# Patient Record
Sex: FEMALE | Race: WHITE | NOT HISPANIC OR LATINO | Employment: OTHER | URBAN - METROPOLITAN AREA
[De-identification: names, ages, dates, MRNs, and addresses within clinical notes are randomized per-mention and may not be internally consistent; named-entity substitution may affect disease eponyms.]

---

## 2020-04-25 ENCOUNTER — HOSPITAL ENCOUNTER (EMERGENCY)
Facility: HOSPITAL | Age: 85
Discharge: HOME | End: 2020-04-26
Attending: EMERGENCY MEDICINE
Payer: MEDICARE

## 2020-04-25 DIAGNOSIS — R63.0 DECREASED APPETITE: Primary | ICD-10-CM

## 2020-04-25 DIAGNOSIS — U07.1 COVID-19: ICD-10-CM

## 2020-04-25 DIAGNOSIS — E86.1 INTRAVASCULAR VOLUME DEPLETION: ICD-10-CM

## 2020-04-25 PROCEDURE — 96374 THER/PROPH/DIAG INJ IV PUSH: CPT

## 2020-04-25 PROCEDURE — 99284 EMERGENCY DEPT VISIT MOD MDM: CPT | Mod: 25

## 2020-04-25 PROCEDURE — 96361 HYDRATE IV INFUSION ADD-ON: CPT

## 2020-04-26 VITALS
DIASTOLIC BLOOD PRESSURE: 78 MMHG | BODY MASS INDEX: 24.75 KG/M2 | HEIGHT: 66 IN | RESPIRATION RATE: 16 BRPM | SYSTOLIC BLOOD PRESSURE: 149 MMHG | HEART RATE: 88 BPM | WEIGHT: 154 LBS | TEMPERATURE: 98 F | OXYGEN SATURATION: 96 %

## 2020-04-26 LAB
ALBUMIN SERPL BCP-MCNC: 2.8 G/DL (ref 3.5–5.2)
ALP SERPL-CCNC: 104 U/L (ref 55–135)
ALT SERPL W/O P-5'-P-CCNC: 19 U/L (ref 10–44)
ANION GAP SERPL CALC-SCNC: 12 MMOL/L (ref 8–16)
AST SERPL-CCNC: 36 U/L (ref 10–40)
BASOPHILS # BLD AUTO: 0.05 K/UL (ref 0–0.2)
BASOPHILS NFR BLD: 0.4 % (ref 0–1.9)
BILIRUB SERPL-MCNC: 0.7 MG/DL (ref 0.1–1)
BILIRUB UR QL STRIP: ABNORMAL
BUN SERPL-MCNC: 36 MG/DL (ref 10–30)
CALCIUM SERPL-MCNC: 8.8 MG/DL (ref 8.7–10.5)
CHLORIDE SERPL-SCNC: 112 MMOL/L (ref 95–110)
CLARITY UR: CLEAR
CO2 SERPL-SCNC: 20 MMOL/L (ref 23–29)
COLOR UR: YELLOW
CREAT SERPL-MCNC: 1.9 MG/DL (ref 0.5–1.4)
DIFFERENTIAL METHOD: ABNORMAL
EOSINOPHIL # BLD AUTO: 0.2 K/UL (ref 0–0.5)
EOSINOPHIL NFR BLD: 1.4 % (ref 0–8)
ERYTHROCYTE [DISTWIDTH] IN BLOOD BY AUTOMATED COUNT: 13.5 % (ref 11.5–14.5)
EST. GFR  (AFRICAN AMERICAN): 25 ML/MIN/1.73 M^2
EST. GFR  (NON AFRICAN AMERICAN): 22 ML/MIN/1.73 M^2
GLUCOSE SERPL-MCNC: 100 MG/DL (ref 70–110)
GLUCOSE UR QL STRIP: NEGATIVE
HCT VFR BLD AUTO: 41.2 % (ref 37–48.5)
HGB BLD-MCNC: 12.9 G/DL (ref 12–16)
HGB UR QL STRIP: NEGATIVE
IMM GRANULOCYTES # BLD AUTO: 0.06 K/UL (ref 0–0.04)
IMM GRANULOCYTES NFR BLD AUTO: 0.5 % (ref 0–0.5)
KETONES UR QL STRIP: ABNORMAL
LACTATE SERPL-SCNC: 1.6 MMOL/L (ref 0.5–2.2)
LEUKOCYTE ESTERASE UR QL STRIP: NEGATIVE
LYMPHOCYTES # BLD AUTO: 3.3 K/UL (ref 1–4.8)
LYMPHOCYTES NFR BLD: 25.3 % (ref 18–48)
MCH RBC QN AUTO: 29.6 PG (ref 27–31)
MCHC RBC AUTO-ENTMCNC: 31.3 G/DL (ref 32–36)
MCV RBC AUTO: 95 FL (ref 82–98)
MONOCYTES # BLD AUTO: 1.1 K/UL (ref 0.3–1)
MONOCYTES NFR BLD: 8.7 % (ref 4–15)
NEUTROPHILS # BLD AUTO: 8.3 K/UL (ref 1.8–7.7)
NEUTROPHILS NFR BLD: 63.7 % (ref 38–73)
NITRITE UR QL STRIP: NEGATIVE
NRBC BLD-RTO: 0 /100 WBC
PH UR STRIP: 6 [PH] (ref 5–8)
PLATELET # BLD AUTO: 194 K/UL (ref 150–350)
PMV BLD AUTO: 11.2 FL (ref 9.2–12.9)
POTASSIUM SERPL-SCNC: 5.3 MMOL/L (ref 3.5–5.1)
PROT SERPL-MCNC: 7.4 G/DL (ref 6–8.4)
PROT UR QL STRIP: NEGATIVE
RBC # BLD AUTO: 4.36 M/UL (ref 4–5.4)
SODIUM SERPL-SCNC: 144 MMOL/L (ref 136–145)
SP GR UR STRIP: 1.02 (ref 1–1.03)
TROPONIN I SERPL DL<=0.01 NG/ML-MCNC: 0.04 NG/ML (ref 0–0.03)
URN SPEC COLLECT METH UR: ABNORMAL
UROBILINOGEN UR STRIP-ACNC: NEGATIVE EU/DL
WBC # BLD AUTO: 12.95 K/UL (ref 3.9–12.7)

## 2020-04-26 PROCEDURE — 85025 COMPLETE CBC W/AUTO DIFF WBC: CPT

## 2020-04-26 PROCEDURE — 84484 ASSAY OF TROPONIN QUANT: CPT

## 2020-04-26 PROCEDURE — 83605 ASSAY OF LACTIC ACID: CPT

## 2020-04-26 PROCEDURE — 81003 URINALYSIS AUTO W/O SCOPE: CPT

## 2020-04-26 PROCEDURE — 80053 COMPREHEN METABOLIC PANEL: CPT

## 2020-04-26 PROCEDURE — 87040 BLOOD CULTURE FOR BACTERIA: CPT

## 2020-04-26 PROCEDURE — 63600175 PHARM REV CODE 636 W HCPCS: Performed by: EMERGENCY MEDICINE

## 2020-04-26 RX ORDER — ONDANSETRON 4 MG/1
4 TABLET, ORALLY DISINTEGRATING ORAL EVERY 8 HOURS PRN
Qty: 18 TABLET | Refills: 0 | Status: SHIPPED | OUTPATIENT
Start: 2020-04-26 | End: 2022-09-15

## 2020-04-26 RX ORDER — ONDANSETRON 2 MG/ML
8 INJECTION INTRAMUSCULAR; INTRAVENOUS
Status: COMPLETED | OUTPATIENT
Start: 2020-04-26 | End: 2020-04-26

## 2020-04-26 RX ADMIN — ONDANSETRON 8 MG: 2 INJECTION INTRAMUSCULAR; INTRAVENOUS at 01:04

## 2020-04-26 RX ADMIN — SODIUM CHLORIDE, SODIUM LACTATE, POTASSIUM CHLORIDE, AND CALCIUM CHLORIDE 1000 ML: .6; .31; .03; .02 INJECTION, SOLUTION INTRAVENOUS at 12:04

## 2020-04-26 NOTE — ED NOTES
[facility] at [facility]. ETA 1 hour. Charge nurse notified.

## 2020-04-26 NOTE — ED NOTES
Spoke with [facility]; who have accepted patient back to their facility. MD and charge nurse notified.

## 2020-04-26 NOTE — ED PROVIDER NOTES
"SCRIBE #1 NOTE: I, Tamiko Bosch, am scribing for, and in the presence of, Janes Mahan MD. I have scribed the entire note.       History     Chief Complaint   Patient presents with    decresed appetite     covid + , hasnt eaten in 3 days per NH staff     Review of patient's allergies indicates:  No Known Allergies      History of Present Illness     HPI    4/26/2020, 12:18 AM  History limited to pt's dementia.       History of Present Illness: Nargis Pastrana is a 89 y.o. female patient with a h/o depression, glaucoma, HLD, glaucoma,  who presents to the Emergency Department for evaluation of decrease in appetite which onset 3 days ago. Pt is a known COVID-19 positive sent from [facility]. Symptoms are constant and moderate in severity. No mitigating or exacerbating factors reported. HPI/ROS limited due to pt's dementia. Pt has no complaint except "I want to go home," which she states repeatedly.      Arrival mode: EMS/[facility]    PCP: Paul Modi MD      Past Medical History:  Past Medical History:   Diagnosis Date    Depression     Glaucoma (increased eye pressure)     Hyperlipemia     Renal disorder        Past Surgical History:  Past Surgical History:   Procedure Laterality Date    APPENDECTOMY      EYE SURGERY      HYSTERECTOMY           Family History:  Family History   Family history unknown: Yes       Social History:   Social History     Tobacco Use    Smoking status: Never Smoker    Smokeless tobacco: Never Used   Substance and Sexual Activity    Alcohol use: No    Drug use: No    Sexual activity: Unknown        Review of Systems     Review of Systems   Unable to perform ROS: Dementia   Constitutional: Positive for appetite change (decrease).        Physical Exam     Initial Vitals   BP Pulse Resp Temp SpO2   04/25/20 2345 04/25/20 2345 04/25/20 2345 04/26/20 0002 04/25/20 2345   (!) 153/70 91 20 98.6 °F (37 °C) 96 %      MAP       --                 Physical " "Exam  Nursing Notes and Vital Signs Reviewed  Constitutional: Well-developed and well-nourished.   Head: Atraumatic. Normocephalic.  Eyes: EOM intact. No scleral icterus.  ENT: Mucous membranes are moist. Oropharynx is clear and symmetric.    Neck: Supple. Full ROM. No lymphadenopathy.  Cardiovascular: Regular rate. Regular rhythm. No murmurs, rubs, or gallops. Distal pulses are 2+ and symmetric.  Pulmonary/Chest: No respiratory distress. Clear to auscultation bilaterally. No wheezing or rales.  Abdominal: Soft and non-distended.  There is no tenderness.  No rebound, guarding, or rigidity. Good bowel sounds.  Genitourinary: No CVA tenderness  Musculoskeletal: Moves all extremities. No obvious deformities. No calf tenderness.  Skin: Warm and dry.  Neurological: Oriented x0 awake, irritable, nontoxic appearance, in no distress. Normal speech. No acute focal neurological deficits are appreciated.  Psychiatric: Normal affect. Good eye contact. Appropriate in content.     ED Course   Procedures  ED Vital Signs:  Vitals:    04/25/20 2345 04/26/20 0002 04/26/20 0058 04/26/20 0100   BP: (!) 153/70 129/86  136/82   Pulse: 91 96 92    Resp: 20 18 15    Temp:  98.6 °F (37 °C)     TempSrc: Oral Rectal     SpO2: 96% 96% 97%    Weight: 69.9 kg (154 lb)      Height: 5' 6" (1.676 m)       04/26/20 0126 04/26/20 0210 04/26/20 0230 04/26/20 0232   BP:    (!) 162/72   Pulse: 94 89     Resp: 13 13     Temp:   98.1 °F (36.7 °C)    TempSrc:   Rectal    SpO2: 97% 95%     Weight:       Height:        04/26/20 0236 04/26/20 0301 04/26/20 0316 04/26/20 0327   BP:  (!) 154/70  (!) 149/78   Pulse: 90 96 91 88   Resp: 14 18 14 16   Temp:    98.2 °F (36.8 °C)   TempSrc:       SpO2: 95%  96% 96%   Weight:       Height:           Abnormal Lab Results:  Labs Reviewed   CBC W/ AUTO DIFFERENTIAL - Abnormal; Notable for the following components:       Result Value    WBC 12.95 (*)     Mean Corpuscular Hemoglobin Conc 31.3 (*)     Gran # (ANC) 8.3 (*)  "    Immature Grans (Abs) 0.06 (*)     Mono # 1.1 (*)     All other components within normal limits   URINALYSIS, REFLEX TO URINE CULTURE - Abnormal; Notable for the following components:    Ketones, UA 1+ (*)     Bilirubin (UA) 1+ (*)     All other components within normal limits    Narrative:     Preferred Collection Type->Urine, Catheterized   COMPREHENSIVE METABOLIC PANEL - Abnormal; Notable for the following components:    Potassium 5.3 (*)     Chloride 112 (*)     CO2 20 (*)     BUN, Bld 36 (*)     Creatinine 1.9 (*)     Albumin 2.8 (*)     eGFR if  25 (*)     eGFR if non  22 (*)     All other components within normal limits   TROPONIN I - Abnormal; Notable for the following components:    Troponin I 0.043 (*)     All other components within normal limits   CULTURE, BLOOD   CULTURE, BLOOD   LACTIC ACID, PLASMA        All Lab Results:  Results for orders placed or performed during the hospital encounter of 04/25/20   CBC auto differential   Result Value Ref Range    WBC 12.95 (H) 3.90 - 12.70 K/uL    RBC 4.36 4.00 - 5.40 M/uL    Hemoglobin 12.9 12.0 - 16.0 g/dL    Hematocrit 41.2 37.0 - 48.5 %    Mean Corpuscular Volume 95 82 - 98 fL    Mean Corpuscular Hemoglobin 29.6 27.0 - 31.0 pg    Mean Corpuscular Hemoglobin Conc 31.3 (L) 32.0 - 36.0 g/dL    RDW 13.5 11.5 - 14.5 %    Platelets 194 150 - 350 K/uL    MPV 11.2 9.2 - 12.9 fL    Immature Granulocytes 0.5 0.0 - 0.5 %    Gran # (ANC) 8.3 (H) 1.8 - 7.7 K/uL    Immature Grans (Abs) 0.06 (H) 0.00 - 0.04 K/uL    Lymph # 3.3 1.0 - 4.8 K/uL    Mono # 1.1 (H) 0.3 - 1.0 K/uL    Eos # 0.2 0.0 - 0.5 K/uL    Baso # 0.05 0.00 - 0.20 K/uL    nRBC 0 0 /100 WBC    Gran% 63.7 38.0 - 73.0 %    Lymph% 25.3 18.0 - 48.0 %    Mono% 8.7 4.0 - 15.0 %    Eosinophil% 1.4 0.0 - 8.0 %    Basophil% 0.4 0.0 - 1.9 %    Differential Method Automated    Urinalysis, Reflex to Urine Culture Urine, Catheterized   Result Value Ref Range    Specimen UA Urine,  Catheterized     Color, UA Yellow Yellow, Straw, Emily    Appearance, UA Clear Clear    pH, UA 6.0 5.0 - 8.0    Specific Gravity, UA 1.020 1.005 - 1.030    Protein, UA Negative Negative    Glucose, UA Negative Negative    Ketones, UA 1+ (A) Negative    Bilirubin (UA) 1+ (A) Negative    Occult Blood UA Negative Negative    Nitrite, UA Negative Negative    Urobilinogen, UA Negative <2.0 EU/dL    Leukocytes, UA Negative Negative   Lactic acid, plasma   Result Value Ref Range    Lactate (Lactic Acid) 1.6 0.5 - 2.2 mmol/L   Comprehensive metabolic panel   Result Value Ref Range    Sodium 144 136 - 145 mmol/L    Potassium 5.3 (H) 3.5 - 5.1 mmol/L    Chloride 112 (H) 95 - 110 mmol/L    CO2 20 (L) 23 - 29 mmol/L    Glucose 100 70 - 110 mg/dL    BUN, Bld 36 (H) 10 - 30 mg/dL    Creatinine 1.9 (H) 0.5 - 1.4 mg/dL    Calcium 8.8 8.7 - 10.5 mg/dL    Total Protein 7.4 6.0 - 8.4 g/dL    Albumin 2.8 (L) 3.5 - 5.2 g/dL    Total Bilirubin 0.7 0.1 - 1.0 mg/dL    Alkaline Phosphatase 104 55 - 135 U/L    AST 36 10 - 40 U/L    ALT 19 10 - 44 U/L    Anion Gap 12 8 - 16 mmol/L    eGFR if African American 25 (A) >60 mL/min/1.73 m^2    eGFR if non African American 22 (A) >60 mL/min/1.73 m^2   Troponin I   Result Value Ref Range    Troponin I 0.043 (H) 0.000 - 0.026 ng/mL         The EKG was ordered, reviewed, and independently interpreted by the ED provider.  Interpretation time: 23:56  Rate: 97 BPM  Rhythm: NSR  Interpretation: No acute ST changes. No STEMI.      The Emergency Provider reviewed the vital signs and test results, which are outlined above.     ED Discussion     1:36 AM: Reassessed pt at this time.  Pt states her condition has improved at this time. Discussed with pt all pertinent ED information and results. Discussed pt dx and plan of tx. Gave pt all f/u and return to the ED instructions. All questions and concerns were addressed at this time. Pt expresses understanding of information and instructions, and is comfortable with  plan to discharge. Pt is stable for discharge.    I discussed with patient and/or family/caretaker that evaluation in the ED does not suggest any emergent or life threatening medical conditions requiring immediate intervention beyond what was provided in the ED, and I believe patient is safe for discharge.  Regardless, an unremarkable evaluation in the ED does not preclude the development or presence of a serious of life threatening condition. As such, patient was instructed to return immediately for any worsening or change in current symptoms.       MDM        Medical Decision Making:   Clinical Tests:   Lab Tests: Ordered and Reviewed  Medical Tests: Ordered and Reviewed           ED Medication(s):  Medications   lactated ringers bolus 1,000 mL (0 mLs Intravenous Stopped 4/26/20 0147)   ondansetron injection 8 mg (8 mg Intravenous Given 4/26/20 0147)       Discharge Medication List as of 4/26/2020  1:37 AM          Follow-up Information     Paul Modi MD In 1 day.    Specialty:  Internal Medicine  Contact information:  [address] [facility] [address] [phone]             [facility]    Specialty:  Emergency Medicine  Why:  As needed, If symptoms worsen  Contact information:  [address] [facility] [address] [phone]                     Scribe Attestation:   Scribe #1: I performed the above scribed service and the documentation accurately describes the services I performed. I attest to the accuracy of the note.     Attending:   Physician Attestation Statement for Scribe #1: I, Janes Mahan MD, personally performed the services described in this documentation, as scribed by Tamiko Bosch, in my presence, and it is both accurate and complete.           Clinical Impression       ICD-10-CM ICD-9-CM   1. Decreased appetite R63.0 783.0   2. COVID-19 U07.1     J98.8    3. Intravascular volume depletion E86.1 276.52       Disposition:    Disposition: Discharged  Condition: Stable         Janes Mahan MD  04/26/20 5979

## 2020-04-27 NOTE — ED NOTES
TRENICE FROM [place] CALLED AND INFORMED THAT PT HAD + BLOOD CULTURE, GRAM + COCCI RESEMBLING STAPH

## 2020-04-29 LAB
BACTERIA BLD CULT: ABNORMAL

## 2020-04-30 LAB — BACTERIA BLD CULT: NORMAL

## 2022-09-15 ENCOUNTER — HOSPITAL ENCOUNTER (INPATIENT)
Facility: HOSPITAL | Age: 87
LOS: 3 days | Discharge: NURSING, REHAB OR LONG-TERM CARE | DRG: 177 | End: 2022-09-19
Attending: EMERGENCY MEDICINE | Admitting: STUDENT IN AN ORGANIZED HEALTH CARE EDUCATION/TRAINING PROGRAM
Payer: MEDICARE

## 2022-09-15 DIAGNOSIS — J96.01 ACUTE RESPIRATORY FAILURE WITH HYPOXIA: ICD-10-CM

## 2022-09-15 DIAGNOSIS — R93.0 ABNORMAL HEAD CT: ICD-10-CM

## 2022-09-15 DIAGNOSIS — J93.9 PNEUMOTHORAX, UNSPECIFIED TYPE: ICD-10-CM

## 2022-09-15 DIAGNOSIS — R07.9 CHEST PAIN: ICD-10-CM

## 2022-09-15 DIAGNOSIS — R41.82 AMS (ALTERED MENTAL STATUS): ICD-10-CM

## 2022-09-15 DIAGNOSIS — J18.9 PNEUMONIA DUE TO INFECTIOUS ORGANISM, UNSPECIFIED LATERALITY, UNSPECIFIED PART OF LUNG: Primary | ICD-10-CM

## 2022-09-15 PROBLEM — G93.41 ACUTE METABOLIC ENCEPHALOPATHY: Status: ACTIVE | Noted: 2022-09-15

## 2022-09-15 PROBLEM — N17.9 AKI (ACUTE KIDNEY INJURY): Status: ACTIVE | Noted: 2022-09-15

## 2022-09-15 PROBLEM — K59.00 CONSTIPATION: Status: ACTIVE | Noted: 2022-09-15

## 2022-09-15 PROBLEM — J93.11 PRIMARY SPONTANEOUS PNEUMOTHORAX: Status: ACTIVE | Noted: 2022-09-15

## 2022-09-15 LAB
ALBUMIN SERPL BCP-MCNC: 3 G/DL (ref 3.5–5.2)
ALP SERPL-CCNC: 106 U/L (ref 55–135)
ALT SERPL W/O P-5'-P-CCNC: 17 U/L (ref 10–44)
ANION GAP SERPL CALC-SCNC: 10 MMOL/L (ref 8–16)
AST SERPL-CCNC: 21 U/L (ref 10–40)
BASOPHILS # BLD AUTO: 0.04 K/UL (ref 0–0.2)
BASOPHILS NFR BLD: 0.3 % (ref 0–1.9)
BILIRUB SERPL-MCNC: 0.4 MG/DL (ref 0.1–1)
BILIRUB UR QL STRIP: NEGATIVE
BNP SERPL-MCNC: 108 PG/ML (ref 0–99)
BUN SERPL-MCNC: 34 MG/DL (ref 10–30)
CALCIUM SERPL-MCNC: 9.1 MG/DL (ref 8.7–10.5)
CHLORIDE SERPL-SCNC: 112 MMOL/L (ref 95–110)
CLARITY UR: CLEAR
CO2 SERPL-SCNC: 18 MMOL/L (ref 23–29)
COLOR UR: YELLOW
CREAT SERPL-MCNC: 1.5 MG/DL (ref 0.5–1.4)
DIFFERENTIAL METHOD: ABNORMAL
EOSINOPHIL # BLD AUTO: 0.5 K/UL (ref 0–0.5)
EOSINOPHIL NFR BLD: 3.5 % (ref 0–8)
ERYTHROCYTE [DISTWIDTH] IN BLOOD BY AUTOMATED COUNT: 14 % (ref 11.5–14.5)
EST. GFR  (NO RACE VARIABLE): 31 ML/MIN/1.73 M^2
GLUCOSE SERPL-MCNC: 119 MG/DL (ref 70–110)
GLUCOSE UR QL STRIP: NEGATIVE
HCT VFR BLD AUTO: 34.6 % (ref 37–48.5)
HGB BLD-MCNC: 11 G/DL (ref 12–16)
HGB UR QL STRIP: NEGATIVE
HYALINE CASTS #/AREA URNS LPF: 1 /LPF
IMM GRANULOCYTES # BLD AUTO: 0.05 K/UL (ref 0–0.04)
IMM GRANULOCYTES NFR BLD AUTO: 0.4 % (ref 0–0.5)
KETONES UR QL STRIP: NEGATIVE
LACTATE SERPL-SCNC: 1.7 MMOL/L (ref 0.5–2.2)
LEUKOCYTE ESTERASE UR QL STRIP: ABNORMAL
LIPASE SERPL-CCNC: 43 U/L (ref 4–60)
LYMPHOCYTES # BLD AUTO: 4 K/UL (ref 1–4.8)
LYMPHOCYTES NFR BLD: 30.9 % (ref 18–48)
MCH RBC QN AUTO: 29.5 PG (ref 27–31)
MCHC RBC AUTO-ENTMCNC: 31.8 G/DL (ref 32–36)
MCV RBC AUTO: 93 FL (ref 82–98)
MICROSCOPIC COMMENT: ABNORMAL
MONOCYTES # BLD AUTO: 0.7 K/UL (ref 0.3–1)
MONOCYTES NFR BLD: 5.7 % (ref 4–15)
NEUTROPHILS # BLD AUTO: 7.7 K/UL (ref 1.8–7.7)
NEUTROPHILS NFR BLD: 59.2 % (ref 38–73)
NITRITE UR QL STRIP: NEGATIVE
NRBC BLD-RTO: 0 /100 WBC
PH UR STRIP: 5 [PH] (ref 5–8)
PLATELET # BLD AUTO: 208 K/UL (ref 150–450)
PMV BLD AUTO: 9.9 FL (ref 9.2–12.9)
POTASSIUM SERPL-SCNC: 4.2 MMOL/L (ref 3.5–5.1)
PROT SERPL-MCNC: 7.4 G/DL (ref 6–8.4)
PROT UR QL STRIP: NEGATIVE
RBC # BLD AUTO: 3.73 M/UL (ref 4–5.4)
RBC #/AREA URNS HPF: 2 /HPF (ref 0–4)
SARS-COV-2 RDRP RESP QL NAA+PROBE: NEGATIVE
SODIUM SERPL-SCNC: 140 MMOL/L (ref 136–145)
SP GR UR STRIP: 1.02 (ref 1–1.03)
TROPONIN I SERPL DL<=0.01 NG/ML-MCNC: 0.01 NG/ML (ref 0–0.03)
URN SPEC COLLECT METH UR: ABNORMAL
UROBILINOGEN UR STRIP-ACNC: NEGATIVE EU/DL
WBC # BLD AUTO: 12.97 K/UL (ref 3.9–12.7)
WBC #/AREA URNS HPF: 34 /HPF (ref 0–5)
WBC CLUMPS URNS QL MICRO: ABNORMAL

## 2022-09-15 PROCEDURE — 99285 EMERGENCY DEPT VISIT HI MDM: CPT | Mod: 25

## 2022-09-15 PROCEDURE — 81000 URINALYSIS NONAUTO W/SCOPE: CPT | Performed by: EMERGENCY MEDICINE

## 2022-09-15 PROCEDURE — 63600175 PHARM REV CODE 636 W HCPCS: Performed by: EMERGENCY MEDICINE

## 2022-09-15 PROCEDURE — 83690 ASSAY OF LIPASE: CPT | Performed by: EMERGENCY MEDICINE

## 2022-09-15 PROCEDURE — G0378 HOSPITAL OBSERVATION PER HR: HCPCS

## 2022-09-15 PROCEDURE — 25000003 PHARM REV CODE 250: Performed by: EMERGENCY MEDICINE

## 2022-09-15 PROCEDURE — 87040 BLOOD CULTURE FOR BACTERIA: CPT | Mod: 59 | Performed by: EMERGENCY MEDICINE

## 2022-09-15 PROCEDURE — U0002 COVID-19 LAB TEST NON-CDC: HCPCS | Performed by: EMERGENCY MEDICINE

## 2022-09-15 PROCEDURE — 80053 COMPREHEN METABOLIC PANEL: CPT | Performed by: EMERGENCY MEDICINE

## 2022-09-15 PROCEDURE — 84484 ASSAY OF TROPONIN QUANT: CPT | Performed by: EMERGENCY MEDICINE

## 2022-09-15 PROCEDURE — 83605 ASSAY OF LACTIC ACID: CPT | Performed by: EMERGENCY MEDICINE

## 2022-09-15 PROCEDURE — 96365 THER/PROPH/DIAG IV INF INIT: CPT

## 2022-09-15 PROCEDURE — 93005 ELECTROCARDIOGRAM TRACING: CPT

## 2022-09-15 PROCEDURE — 93010 ELECTROCARDIOGRAM REPORT: CPT | Mod: ,,, | Performed by: INTERNAL MEDICINE

## 2022-09-15 PROCEDURE — 63600175 PHARM REV CODE 636 W HCPCS: Performed by: STUDENT IN AN ORGANIZED HEALTH CARE EDUCATION/TRAINING PROGRAM

## 2022-09-15 PROCEDURE — 83880 ASSAY OF NATRIURETIC PEPTIDE: CPT | Performed by: EMERGENCY MEDICINE

## 2022-09-15 PROCEDURE — 85025 COMPLETE CBC W/AUTO DIFF WBC: CPT | Performed by: EMERGENCY MEDICINE

## 2022-09-15 PROCEDURE — 93010 EKG 12-LEAD: ICD-10-PCS | Mod: ,,, | Performed by: INTERNAL MEDICINE

## 2022-09-15 PROCEDURE — 96367 TX/PROPH/DG ADDL SEQ IV INF: CPT

## 2022-09-15 PROCEDURE — 87086 URINE CULTURE/COLONY COUNT: CPT | Performed by: EMERGENCY MEDICINE

## 2022-09-15 PROCEDURE — 96375 TX/PRO/DX INJ NEW DRUG ADDON: CPT

## 2022-09-15 RX ORDER — TALC
6 POWDER (GRAM) TOPICAL NIGHTLY PRN
Status: DISCONTINUED | OUTPATIENT
Start: 2022-09-15 | End: 2022-09-19 | Stop reason: HOSPADM

## 2022-09-15 RX ORDER — ACETAMINOPHEN 325 MG/1
650 TABLET ORAL EVERY 4 HOURS PRN
Status: DISCONTINUED | OUTPATIENT
Start: 2022-09-15 | End: 2022-09-19 | Stop reason: HOSPADM

## 2022-09-15 RX ORDER — SODIUM CHLORIDE, SODIUM LACTATE, POTASSIUM CHLORIDE, CALCIUM CHLORIDE 600; 310; 30; 20 MG/100ML; MG/100ML; MG/100ML; MG/100ML
INJECTION, SOLUTION INTRAVENOUS CONTINUOUS
Status: ACTIVE | OUTPATIENT
Start: 2022-09-15 | End: 2022-09-16

## 2022-09-15 RX ORDER — MIRTAZAPINE 7.5 MG/1
7.5 TABLET, FILM COATED ORAL NIGHTLY
COMMUNITY
Start: 2022-09-08

## 2022-09-15 RX ORDER — ACETAMINOPHEN 325 MG/1
650 TABLET ORAL EVERY 8 HOURS PRN
Status: DISCONTINUED | OUTPATIENT
Start: 2022-09-15 | End: 2022-09-19 | Stop reason: HOSPADM

## 2022-09-15 RX ORDER — NALOXONE HCL 0.4 MG/ML
0.02 VIAL (ML) INJECTION
Status: DISCONTINUED | OUTPATIENT
Start: 2022-09-15 | End: 2022-09-19 | Stop reason: HOSPADM

## 2022-09-15 RX ORDER — IBUPROFEN 200 MG
16 TABLET ORAL
Status: DISCONTINUED | OUTPATIENT
Start: 2022-09-15 | End: 2022-09-19 | Stop reason: HOSPADM

## 2022-09-15 RX ORDER — LATANOPROST 50 UG/ML
1 SOLUTION/ DROPS OPHTHALMIC NIGHTLY
Status: DISCONTINUED | OUTPATIENT
Start: 2022-09-15 | End: 2022-09-19 | Stop reason: HOSPADM

## 2022-09-15 RX ORDER — ONDANSETRON 2 MG/ML
4 INJECTION INTRAMUSCULAR; INTRAVENOUS
Status: COMPLETED | OUTPATIENT
Start: 2022-09-15 | End: 2022-09-15

## 2022-09-15 RX ORDER — MIRTAZAPINE 7.5 MG/1
7.5 TABLET, FILM COATED ORAL NIGHTLY
Status: DISCONTINUED | OUTPATIENT
Start: 2022-09-15 | End: 2022-09-19 | Stop reason: HOSPADM

## 2022-09-15 RX ORDER — DOCUSATE CALCIUM 240 MG
240 CAPSULE ORAL DAILY
Status: ON HOLD | COMMUNITY
End: 2022-09-19

## 2022-09-15 RX ORDER — MORPHINE SULFATE 4 MG/ML
2 INJECTION, SOLUTION INTRAMUSCULAR; INTRAVENOUS
Status: COMPLETED | OUTPATIENT
Start: 2022-09-15 | End: 2022-09-15

## 2022-09-15 RX ORDER — LATANOPROST 50 UG/ML
1 SOLUTION/ DROPS OPHTHALMIC NIGHTLY
COMMUNITY
Start: 2022-09-01

## 2022-09-15 RX ORDER — ONDANSETRON 2 MG/ML
4 INJECTION INTRAMUSCULAR; INTRAVENOUS EVERY 8 HOURS PRN
Status: DISCONTINUED | OUTPATIENT
Start: 2022-09-15 | End: 2022-09-19 | Stop reason: HOSPADM

## 2022-09-15 RX ORDER — SODIUM CHLORIDE 0.9 % (FLUSH) 0.9 %
10 SYRINGE (ML) INJECTION EVERY 12 HOURS PRN
Status: DISCONTINUED | OUTPATIENT
Start: 2022-09-15 | End: 2022-09-19 | Stop reason: HOSPADM

## 2022-09-15 RX ORDER — IBUPROFEN 200 MG
24 TABLET ORAL
Status: DISCONTINUED | OUTPATIENT
Start: 2022-09-15 | End: 2022-09-19 | Stop reason: HOSPADM

## 2022-09-15 RX ORDER — GLUCAGON 1 MG
1 KIT INJECTION
Status: DISCONTINUED | OUTPATIENT
Start: 2022-09-15 | End: 2022-09-19 | Stop reason: HOSPADM

## 2022-09-15 RX ADMIN — ONDANSETRON 4 MG: 2 INJECTION INTRAMUSCULAR; INTRAVENOUS at 08:09

## 2022-09-15 RX ADMIN — AZITHROMYCIN MONOHYDRATE 500 MG: 500 INJECTION, POWDER, LYOPHILIZED, FOR SOLUTION INTRAVENOUS at 02:09

## 2022-09-15 RX ADMIN — MORPHINE SULFATE 2 MG: 4 INJECTION INTRAVENOUS at 08:09

## 2022-09-15 RX ADMIN — SODIUM CHLORIDE, SODIUM LACTATE, POTASSIUM CHLORIDE, AND CALCIUM CHLORIDE: .6; .31; .03; .02 INJECTION, SOLUTION INTRAVENOUS at 05:09

## 2022-09-15 RX ADMIN — CEFTRIAXONE 1 G: 1 INJECTION, SOLUTION INTRAVENOUS at 12:09

## 2022-09-15 NOTE — H&P
[facility] - [address] Medicine  History & Physical    Patient Name: Nargis Pastrana  MRN: [id number]  Patient Class: OP- Observation  Admission Date: 9/15/2022  Attending Physician: Jaime Cruz MD   Primary Care Provider: Paul Modi MD         Patient information was obtained from patient, past medical records and ER records.     Subjective:     Principal Problem:Right lower lobe pneumonia    Chief Complaint:   Chief Complaint   Patient presents with    Altered Mental Status     Resident at [facility], pt suddenly started  moaning and not verbalizing a complaint, she is moaning and this is not her normal per staff, she is rubbing epigastric area o2 sat on scene 89, arrived on 4L NC        HPI: 89 y.o. female patient with a PMHx of renal disorder and hyperlipidemia who presents to the Emergency Department for evaluation of AMS. Pt is a resident at [facility] who was sent to the ED by staff who state pt began suddenly moaning and rubbing epigastric area but not verbalizing a complaint. Staff at [facility] state this is not the pt's normal behavior. CT chest shows RLL consolidation and lung collapse. There is also small apical PTX, CTS consulted.       Review of Systems   Unable to perform ROS: Mental status change   Objective:     Vital Signs (Most Recent):  Temp: 97.6 °F (36.4 °C) (09/15/22 1602)  Pulse: (!) 53 (09/15/22 1602)  Resp: 18 (09/15/22 1602)  BP: 112/76 (09/15/22 1602)  SpO2: 95 % (09/15/22 1602)   Vital Signs (24h Range):  Temp:  [97.6 °F (36.4 °C)-98.7 °F (37.1 °C)] 97.6 °F (36.4 °C)  Pulse:  [] 53  Resp:  [14-28] 18  SpO2:  [88 %-99 %] 95 %  BP: ()/(51-93) 112/76     Weight: 55.8 kg (123 lb 0.3 oz)  Body mass index is 19.86 kg/m².  No intake or output data in the 24 hours ending 09/15/22 1645   Physical Exam  Constitutional:       General: She is in acute distress.      Appearance: Normal appearance. She is ill-appearing.   HENT:      Head:  Normocephalic and atraumatic.      Mouth/Throat:      Mouth: Mucous membranes are dry.   Cardiovascular:      Rate and Rhythm: Regular rhythm. Bradycardia present.   Pulmonary:      Effort: Pulmonary effort is normal.      Breath sounds: Normal breath sounds.   Abdominal:      General: Abdomen is flat. There is no distension.      Palpations: Abdomen is soft.      Tenderness: There is abdominal tenderness. There is no guarding.   Musculoskeletal:         General: No swelling. Normal range of motion.      Right lower leg: No edema.      Left lower leg: No edema.   Skin:     General: Skin is warm and dry.   Neurological:      Mental Status: She is alert. She is disoriented.       Significant Labs: All pertinent labs within the past 24 hours have been reviewed.    Significant Imaging: I have reviewed all pertinent imaging results/findings within the past 24 hours.    Assessment/Plan:     * Right lower lobe pneumonia  S/t aspiration  Empiric Abx  ST to eval and treat      Admit for observation    Acute metabolic encephalopathy  Unclear etiology  S/t above?      Primary spontaneous pneumothorax  Small, apical  CTS consulted, f/u recs  Repeat CXR      Constipation  Enema for BM      TRAV (acute kidney injury)  Patient with acute kidney injury likely due to IVVD/dehydration TRAV is currently stable. Labs reviewed- Renal function/electrolytes with Estimated Creatinine Clearance: 17.1 mL/min (A) (based on SCr of 1.5 mg/dL (H)). according to latest data. Monitor urine output and serial BMP and adjust therapy as needed. Avoid nephrotoxins and renally dose meds for GFR listed above.     S/t above  IV fluids, trend Cr    VTE Risk Mitigation (From admission, onward)         Ordered     IP VTE HIGH RISK PATIENT  Once         09/15/22 1521     Place sequential compression device  Until discontinued         09/15/22 1521                   Jaime Cruz MD  Department of Hospital Medicine   [facility] - Med Surg 3

## 2022-09-15 NOTE — ED PROVIDER NOTES
SCRIBE #1 NOTE: I, Tiara Spaulding, am scribing for, and in the presence of, Jose Daniel Soto MD. I have scribed the entire note.       History     Chief Complaint   Patient presents with    Altered Mental Status     Resident at [facility], pt suddenly started  moaning and not verbalizing a complaint, she is moaning and this is not her normal per staff, she is rubbing epigastric area o2 sat on scene 89, arrived on 4L NC     Review of patient's allergies indicates:  No Known Allergies      History of Present Illness     HPI    9/15/2022, 6:32 AM  History obtained from the patient  HPI and ROS limited due to AMS      History of Present Illness: Nargis Pastrana is a 89 y.o. female patient with a PMHx of renal disorder and hyperlipidemia who presents to the Emergency Department for evaluation of AMS. Pt is a resident at [facility] who was sent to the ED by staff who state pt began suddenly moaning and rubbing epigastric area but not verbalizing a complaint. Staff at [facility] state this is not the pt's normal behavior. Symptoms are constant and moderate in severity. No mitigating or exacerbating factors reported. No prior Tx includes reported. No further complaints or concerns at this time.       Arrival mode: Ambulance service    PCP: Paul Modi MD        Past Medical History:  Past Medical History:   Diagnosis Date    Depression     Glaucoma (increased eye pressure)     Hyperlipemia     Renal disorder        Past Surgical History:  Past Surgical History:   Procedure Laterality Date    APPENDECTOMY      EYE SURGERY      HYSTERECTOMY           Family History:  Family History   Family history unknown: Yes       Social History:  Social History     Tobacco Use    Smoking status: Never    Smokeless tobacco: Never   Substance and Sexual Activity    Alcohol use: No    Drug use: No    Sexual activity: Not on file        Review of Systems     Review of Systems   Unable to perform ROS: Mental status  change      Physical Exam     Initial Vitals [09/15/22 0513]   BP Pulse Resp Temp SpO2   (!) 156/80 102 18 98.7 °F (37.1 °C) (!) 94 %      MAP       --          Physical Exam  Nursing Notes and Vital Signs Reviewed.  Constitutional:  Holding her hands at her epigastric region moaning.   Head: Atraumatic. Normocephalic.  Eyes: PERRL. EOM intact. Conjunctivae are not pale. No scleral icterus.  ENT: Mucous membranes are moist. Oropharynx is clear and symmetric.    Neck: Supple.  Cardiovascular: Regular rate. Regular rhythm. No murmurs, rubs, or gallops. Distal pulses are 2+ and symmetric.  Pulmonary/Chest:   Abdominal: epigastric tenderness.  No rebound, guarding, or rigidity.   Musculoskeletal: Moves all extremities. No obvious deformities. No edema.  Skin: Warm and dry.  Neurological:  Awake and tracking with eye movements.  Moaning.  Unable to answer questions appropriately      ED Course   Critical Care    Date/Time: 9/15/2022 12:58 PM  Performed by: Jose Daniel Soto MD  Authorized by: Jose Daniel Soto MD   Direct patient critical care time: 10 minutes  Additional history critical care time: 10 minutes  Ordering / reviewing critical care time: 5 minutes  Documentation critical care time: 5 minutes  Consulting other physicians critical care time: 5 minutes  Total critical care time (exclusive of procedural time) : 35 minutes  Critical care time was exclusive of separately billable procedures and treating other patients and teaching time.  Critical care was necessary to treat or prevent imminent or life-threatening deterioration of the following conditions: respiratory failure.  Critical care was time spent personally by me on the following activities: blood draw for specimens, development of treatment plan with patient or surrogate, discussions with consultants, evaluation of patient's response to treatment, examination of patient, interpretation of cardiac output measurements, obtaining history from patient or  "surrogate, ordering and performing treatments and interventions, ordering and review of laboratory studies, ordering and review of radiographic studies, pulse oximetry, re-evaluation of patient's condition and review of old charts.      ED Vital Signs:  Vitals:    09/15/22 0513 09/15/22 0525 09/15/22 0730 09/15/22 0800   BP: (!) 156/80  (!) 189/89 (!) 194/92   Pulse: 102 75 93 100   Resp: 18  20 (!) 21   Temp: 98.7 °F (37.1 °C)  98.2 °F (36.8 °C)    TempSrc: Oral  Oral    SpO2: (!) 94%  95% (!) 94%   Weight: 69.9 kg (154 lb)      Height: 5' 6" (1.676 m)       09/15/22 0815 09/15/22 0823 09/15/22 0832 09/15/22 0850   BP:    (!) 144/65   Pulse:  103  93   Resp: (!) 25 (!) 28 16 19   Temp:       TempSrc:       SpO2:  (!) 88%  95%   Weight:       Height:        09/15/22 0902 09/15/22 0930 09/15/22 1030 09/15/22 1130   BP: (!) 121/51 (!) 136/58 (!) 93/55 (!) 128/59   Pulse: 87 82 92 78   Resp: 14 16 (!) 23 17   Temp: 97.8 °F (36.6 °C)      TempSrc: Oral      SpO2: 97% 99% (!) 94% 98%   Weight:       Height:        09/15/22 1200 09/15/22 1300   BP: (!) 141/93 125/65   Pulse: 82 90   Resp: 20 19   Temp:     TempSrc:     SpO2: 98% 96%   Weight:     Height:         Abnormal Lab Results:  Labs Reviewed   CBC W/ AUTO DIFFERENTIAL - Abnormal; Notable for the following components:       Result Value    WBC 12.97 (*)     RBC 3.73 (*)     Hemoglobin 11.0 (*)     Hematocrit 34.6 (*)     MCHC 31.8 (*)     Immature Grans (Abs) 0.05 (*)     All other components within normal limits   B-TYPE NATRIURETIC PEPTIDE - Abnormal; Notable for the following components:     (*)     All other components within normal limits   URINALYSIS, REFLEX TO URINE CULTURE - Abnormal; Notable for the following components:    Leukocytes, UA 2+ (*)     All other components within normal limits    Narrative:     Specimen Source->Urine   COMPREHENSIVE METABOLIC PANEL - Abnormal; Notable for the following components:    Chloride 112 (*)     CO2 18 (*)     " Glucose 119 (*)     BUN 34 (*)     Creatinine 1.5 (*)     Albumin 3.0 (*)     eGFR 31 (*)     All other components within normal limits   URINALYSIS MICROSCOPIC - Abnormal; Notable for the following components:    WBC, UA 34 (*)     WBC Clumps, UA Many (*)     All other components within normal limits    Narrative:     Specimen Source->Urine   CULTURE, URINE   CULTURE, BLOOD   CULTURE, BLOOD   SARS-COV-2 RNA AMPLIFICATION, QUAL   LIPASE   TROPONIN I   LACTIC ACID, PLASMA        All Lab Results:  Results for orders placed or performed during the hospital encounter of 09/15/22   CBC auto differential   Result Value Ref Range    WBC 12.97 (H) 3.90 - 12.70 K/uL    RBC 3.73 (L) 4.00 - 5.40 M/uL    Hemoglobin 11.0 (L) 12.0 - 16.0 g/dL    Hematocrit 34.6 (L) 37.0 - 48.5 %    MCV 93 82 - 98 fL    MCH 29.5 27.0 - 31.0 pg    MCHC 31.8 (L) 32.0 - 36.0 g/dL    RDW 14.0 11.5 - 14.5 %    Platelets 208 150 - 450 K/uL    MPV 9.9 9.2 - 12.9 fL    Immature Granulocytes 0.4 0.0 - 0.5 %    Gran # (ANC) 7.7 1.8 - 7.7 K/uL    Immature Grans (Abs) 0.05 (H) 0.00 - 0.04 K/uL    Lymph # 4.0 1.0 - 4.8 K/uL    Mono # 0.7 0.3 - 1.0 K/uL    Eos # 0.5 0.0 - 0.5 K/uL    Baso # 0.04 0.00 - 0.20 K/uL    nRBC 0 0 /100 WBC    Gran % 59.2 38.0 - 73.0 %    Lymph % 30.9 18.0 - 48.0 %    Mono % 5.7 4.0 - 15.0 %    Eosinophil % 3.5 0.0 - 8.0 %    Basophil % 0.3 0.0 - 1.9 %    Differential Method Automated    Brain natriuretic peptide   Result Value Ref Range     (H) 0 - 99 pg/mL   COVID-19 Rapid Screening   Result Value Ref Range    SARS-CoV-2 RNA, Amplification, Qual Negative Negative   Urinalysis, Reflex to Urine Culture Urine, Catheterized    Specimen: Urine   Result Value Ref Range    Specimen UA Urine, Catheterized     Color, UA Yellow Yellow, Straw, Emily    Appearance, UA Clear Clear    pH, UA 5.0 5.0 - 8.0    Specific Gravity, UA 1.020 1.005 - 1.030    Protein, UA Negative Negative    Glucose, UA Negative Negative    Ketones, UA Negative  Negative    Bilirubin (UA) Negative Negative    Occult Blood UA Negative Negative    Nitrite, UA Negative Negative    Urobilinogen, UA Negative <2.0 EU/dL    Leukocytes, UA 2+ (A) Negative   Comprehensive metabolic panel   Result Value Ref Range    Sodium 140 136 - 145 mmol/L    Potassium 4.2 3.5 - 5.1 mmol/L    Chloride 112 (H) 95 - 110 mmol/L    CO2 18 (L) 23 - 29 mmol/L    Glucose 119 (H) 70 - 110 mg/dL    BUN 34 (H) 10 - 30 mg/dL    Creatinine 1.5 (H) 0.5 - 1.4 mg/dL    Calcium 9.1 8.7 - 10.5 mg/dL    Total Protein 7.4 6.0 - 8.4 g/dL    Albumin 3.0 (L) 3.5 - 5.2 g/dL    Total Bilirubin 0.4 0.1 - 1.0 mg/dL    Alkaline Phosphatase 106 55 - 135 U/L    AST 21 10 - 40 U/L    ALT 17 10 - 44 U/L    Anion Gap 10 8 - 16 mmol/L    eGFR 31 (A) >60 mL/min/1.73 m^2   Lipase   Result Value Ref Range    Lipase 43 4 - 60 U/L   Troponin I   Result Value Ref Range    Troponin I 0.010 0.000 - 0.026 ng/mL   Urinalysis Microscopic   Result Value Ref Range    RBC, UA 2 0 - 4 /hpf    WBC, UA 34 (H) 0 - 5 /hpf    WBC Clumps, UA Many (A) None-Rare    Hyaline Casts, UA 1 0-1/lpf /lpf    Microscopic Comment SEE COMMENT    Lactic acid, plasma   Result Value Ref Range    Lactate (Lactic Acid) 1.7 0.5 - 2.2 mmol/L         Imaging Results:  Imaging Results              MRI Brain Without Contrast (Final result)  Result time 09/15/22 14:04:28      Final result by Oziel Larsen MD (09/15/22 14:04:28)                   Impression:      No acute or focal process.  Moderately advanced atrophy and white matter degeneration.      Electronically signed by: Oziel Larsen MD  Date:    09/15/2022  Time:    14:04               Narrative:    EXAMINATION:  MRI BRAIN WITHOUT CONTRAST    CLINICAL HISTORY:  Aphasia.  TIA.  Mental status change, unknown cause;    TECHNIQUE:  Standard multiplanar noncontrast sequences of the brain.    COMPARISON:  CT brain 09/15/2022.    FINDINGS:  The ventricles are moderately enlarged as are the extra-axial CSF  spaces.    There is moderately extensive white matter hyperintensity, more pronounced in the frontal lobe distribution.    The gradient echo sequence is negative.  No evidence of chronic hemorrhage.    No extra-axial fluid collection is seen.    Skull base appears normal.    The diffusion sequence is negative.                                       CT Chest Abdomen Pelvis Without Contrast (XPD) (Final result)  Result time 09/15/22 10:00:57   Procedure changed from CT Chest Abdomen Pelvis With Contrast (xpd)     Final result by Anette White MD (09/15/22 10:00:57)                   Impression:      Near complete collapse of the right lower lobe with superimposed consolidation, concerning for aspiration or pneumonia.  Opacity present within the right lower lobe bronchial tree, concerning for aspiration, mucous plugging, or endobronchial lesion.    Small right pneumothorax.    Mild deformity of the mid sternal body with slightly increased sclerosis at this site.  Correlate for prior injury.  No definite acute fracture seen.  Underlying osseous lesion cannot be excluded.    Bilateral pulmonary nodules with the largest measuring 1.3 cm on the right, partially calcified.  A noncalcified 1.0 cm nodule present within the lingula.  For a solid nodule >8 mm, Fleischner Society 2017 guidelines recommend considering CT, PET/CT or tissue sampling at 3 months.    Additional findings as above.    Findings discussed with Dr. Soto over the telephone at 10:00 on 09/15/2022.      Electronically signed by: Anette White  Date:    09/15/2022  Time:    10:00               Narrative:    EXAMINATION:  CT CHEST ABDOMEN PELVIS WITHOUT CONTRAST(XPD)    CLINICAL HISTORY:  Sepsis;complaining of chest and abdomen pain. Dementia. can't provide accurate history;    TECHNIQUE:  Low dose axial images, sagittal and coronal reformations were obtained from the thoracic inlet to the pubic synthesis .  Oral contrast was not administered. All  CT scans at this facility are performed using dose optimization techniques including the following: automated exposure control; adjustment of the mA and/or kV; use of iterative reconstruction technique.    COMPARISON:  Chest radiograph 09/15/2022    FINDINGS:  Beam hardening artifact, patient motion, noncontrast technique somewhat limits evaluation.    Thoracic soft tissues: No significant abnormality.    Aorta: Mildly tortuous, however normal in caliber.  There is severe atherosclerotic calcification.  Significant calcification additionally present at the origin of the left subclavian artery.    Heart: Normal in size small pericardial effusion.  There is severe coronary artery atherosclerosis.  There is significant calcification of the aortic valve.  Additional calcification present at the mitral valve annulus.    Jasmyn/Mediastinum: No significant lymphadenopathy    Lungs: There is a small right pneumothorax.  There is near complete collapse of the right lower lobe with superimposed consolidation.  Opacity present within the right lower lobe bronchial tree.  There is mild ground-glass density within the right middle lobe, which may be due to atelectatic change versus additional infiltrate.  Within the posterior right upper lobe lies a partially calcified 1.1 x 1.3 cm nodule.  The left lung demonstrates dependent density within the posterior aspect of the left lower lobe which may be due to atelectatic change or additional site of infiltrate.  There is a noncalcified 1.0 cm nodule within the lingula (axial series 6, image 208).  There is trace bilateral pleural fluid, right more than left.  No left pneumothorax.  Bronchial tree on the left appears patent..    Liver: Appears normal in size and attenuation without concerning focal hepatic abnormality within the limits of noncontrast technique and beam hardening artifact.  Calcified granulomas noted.    Gallbladder: No calcified gallstones.    Bile Ducts: No evidence of  dilated ducts.    Pancreas: No mass or peripancreatic fat stranding.    Spleen: Unremarkable.    Adrenals: Unremarkable.    Kidneys/ Ureters: Kidneys are small in size with a lobular contour, normal invade location.  No nephrolithiasis or hydronephrosis.  Ureters are normal in course and caliber.  Normal concentration and excretion of contrast. No hydronephrosis or nephrolithiasis. No ureteral dilatation.    Bladder: No evidence of wall thickening.    Reproductive organs: Unremarkable.    GI Tract/Mesentery: Small hiatal hernia.  No evidence of bowel obstruction or inflammation. Significant volume stool within the rectum.  No inflammatory change in the expected region of the appendix to indicate appendicitis.  There are few colonic diverticula without inflammatory change of diverticulitis.  Portions of the sigmoid colon are decompressed with mild mural prominence, suspected related to decompression, though underlying lesion cannot be excluded at these sites, particularly at the rectosigmoid junction (axial series 3, image 140).  Correlate with recent colonoscopy.    Peritoneal Space: No ascites. No free air.    Retroperitoneum: No significant adenopathy.    Abdominal wall: Small fat containing umbilical and right inguinal hernias.    Vasculature: There is severe aortic atherosclerosis.  No aneurysm.    Bones: No acute fracture. There is osteopenia and degenerative change of the spine noting grade 1 retrolisthesis of L2 on L3.  There is mild deformity of the mid sternal body with slightly increased sclerosis at this site.  Correlate for prior injury.                                       CT Head Without Contrast (Final result)  Result time 09/15/22 08:19:59      Final result by Anette White MD (09/15/22 08:19:59)                   Impression:      Small 7 x 4 mm extra-axial lesion overlying the right frontal lobe.  Finding may be incidental and relate to etiology such as small meningioma, though tiny  hematoma cannot be excluded.  No underlying mass effect.  Recommend MRI for further evaluation.    Generalized cerebral volume loss with moderate degree of chronic microvascular ischemic change.  Element of normal pressure hydrocephalus is possible.      Electronically signed by: Anette White  Date:    09/15/2022  Time:    08:19               Narrative:    EXAMINATION:  CT HEAD WITHOUT CONTRAST    CLINICAL HISTORY:  Mental status change, unknown cause;    TECHNIQUE:  Low dose axial CT images obtained throughout the head without intravenous contrast. Sagittal and coronal reconstructions were performed.  All CT scans at this facility are performed using dose optimization techniques including the following: automated exposure control; adjustment of the mA and/or kV; use of iterative reconstruction technique.    COMPARISON:  CT head without contrast 06/22/2013    FINDINGS:  Intracranial compartment:    There is diffuse ventricular and sulcal enlargement, most suggestive of generalized cerebral volume loss.  Element of normal pressure hydrocephalus not excluded.  Small 7 x 4 mm extra-axial focus over the right frontal lobe.  No underlying mass effect    There is moderate hypoattenuation within the supratentorial white matter, most commonly seen in the setting of chronic microvascular ischemic change.  No parenchymal mass, hemorrhage, edema or major vascular distribution infarct.    Skull/extracranial contents (limited evaluation): No fracture. Mastoid air cells and paranasal sinuses are essentially clear.                                       X-Ray Pelvis Routine AP (Final result)  Result time 09/15/22 07:21:26      Final result by Jose Daniel Pham MD (09/15/22 07:21:26)                   Impression:      No acute fracture or dislocation.      Electronically signed by: Jose Daniel Pham MD  Date:    09/15/2022  Time:    07:21               Narrative:    EXAMINATION:  XR PELVIS ROUTINE AP    CLINICAL HISTORY:  XR PELVIS  ROUTINE APAltered mental status, unspecified    COMPARISON:  None    FINDINGS:  AP views of the pelvis were obtained.    No evidence of acute fracture or dislocation.  Bony mineralization is normal.  Soft tissues are unremarkable.   Mild constipation.  Mild degenerative changes lower lumbar spine and bilateral acetabulum.                                       X-Ray Chest AP Portable (Edited Result - FINAL)  Result time 09/15/22 10:33:52      Addendum (preliminary) 1 of 1 by Jose Daniel Pham MD (09/15/22 10:33:52)      Small right apical pneumothorax.      Electronically signed by: Jose Daniel Pham MD  Date:    09/15/2022  Time:    10:33                 Final result by Jose Daniel Pham MD (09/15/22 07:26:57)                   Impression:      No acute process seen.      Electronically signed by: Jose Daniel Pham MD  Date:    09/15/2022  Time:    07:26               Narrative:    EXAMINATION:  XR CHEST AP PORTABLE    CLINICAL HISTORY:  hypoxia;    FINDINGS:  Single view of the chest.  Comparison 06/22/2013    Cardiac silhouette is normal.  Aorta demonstrates atherosclerotic disease. The lungs demonstrate no evidence of active disease.  Mild basilar atelectasis.  No evidence of pleural effusion or pneumothorax.  Bones appear intact.  Moderate scattered degenerative change.                                       The EKG was ordered, reviewed, and independently interpreted by the ED provider.  Interpretation time: 6:58  Rate: 89 BPM  Rhythm:  Sinus rhythm with Premature atrial complexes  Interpretation: No acute ST or T wave elevations or abnormalities. No STEMI.             The Emergency Provider reviewed the vital signs and test results, which are outlined above.     ED Discussion   11:29 PM: Consulted Dr. Arzola (Cardiothoracic surgery) for recommendations on management of PTX.He advises no chest tube at this time. observe over 24 hours with repeat CXR.    2:14 PM: Discussed case with Evita Cruz NP (Hospital Medicine).  Dr. Cruz agrees with current care and management of pt and accepts admission.   Admitting Service: Hospital medicine   Admitting Physician: Dr. Cruz  Admit to: obs    2:15 PM: Re-evaluated pt. I have discussed test results, shared treatment plan, and the need for admission with patient and family at bedside. Pt and family express understanding at this time and agree with all information. All questions answered. Pt and family have no further questions or concerns at this time. Pt is ready for admit.                ED Course as of 09/16/22 0832   Thu Sep 15, 2022   0811 Patient moaning in pain. Hands on epigastric region. CT chest/abd/pelvis ordered. Pain med ordered [DP]      ED Course User Index  [DP] Jose Daniel Soto MD     Medical Decision Making:   Clinical Tests:   Lab Tests: Ordered and Reviewed  Radiological Study: Ordered and Reviewed  Medical Tests: Ordered and Reviewed         ED Medication(s):  Medications   azithromycin 500 mg in dextrose 5 % 250 mL IVPB (ready to mix system) (has no administration in time range)   morphine injection 2 mg (2 mg Intravenous Given 9/15/22 0815)   ondansetron injection 4 mg (4 mg Intravenous Given 9/15/22 0818)   morphine injection 2 mg (2 mg Intravenous Given 9/15/22 0832)   cefTRIAXone (ROCEPHIN) 1 g/50 mL D5W IVPB (1 g Intravenous New Bag 9/15/22 1242)       New Prescriptions    No medications on file               Scribe Attestation:   Scribe #1: I performed the above scribed service and the documentation accurately describes the services I performed. I attest to the accuracy of the note.     Attending:   Physician Attestation Statement for Scribe #1: I, Jose Daniel Soto MD, personally performed the services described in this documentation, as scribed by Tiara Spaulding, in my presence, and it is both accurate and complete.           Clinical Impression       ICD-10-CM ICD-9-CM   1. Pneumonia due to infectious organism, unspecified laterality, unspecified part of lung  J18.9  486   2. AMS (altered mental status)  R41.82 780.97   3. Pneumothorax, unspecified type  J93.9 512.89   4. Abnormal head CT  R93.0 793.0   5. Acute respiratory failure with hypoxia  J96.01 518.81       Disposition:   Disposition: Admitted  Condition: Adeline Soto MD  09/16/22 0837

## 2022-09-15 NOTE — SUBJECTIVE & OBJECTIVE
Review of Systems   Unable to perform ROS: Mental status change   Objective:     Vital Signs (Most Recent):  Temp: 97.6 °F (36.4 °C) (09/15/22 1602)  Pulse: (!) 53 (09/15/22 1602)  Resp: 18 (09/15/22 1602)  BP: 112/76 (09/15/22 1602)  SpO2: 95 % (09/15/22 1602)   Vital Signs (24h Range):  Temp:  [97.6 °F (36.4 °C)-98.7 °F (37.1 °C)] 97.6 °F (36.4 °C)  Pulse:  [] 53  Resp:  [14-28] 18  SpO2:  [88 %-99 %] 95 %  BP: ()/(51-93) 112/76     Weight: 55.8 kg (123 lb 0.3 oz)  Body mass index is 19.86 kg/m².  No intake or output data in the 24 hours ending 09/15/22 1645   Physical Exam  Constitutional:       General: She is in acute distress.      Appearance: Normal appearance. She is ill-appearing.   HENT:      Head: Normocephalic and atraumatic.      Mouth/Throat:      Mouth: Mucous membranes are dry.   Cardiovascular:      Rate and Rhythm: Regular rhythm. Bradycardia present.   Pulmonary:      Effort: Pulmonary effort is normal.      Breath sounds: Normal breath sounds.   Abdominal:      General: Abdomen is flat. There is no distension.      Palpations: Abdomen is soft.      Tenderness: There is abdominal tenderness. There is no guarding.   Musculoskeletal:         General: No swelling. Normal range of motion.      Right lower leg: No edema.      Left lower leg: No edema.   Skin:     General: Skin is warm and dry.   Neurological:      Mental Status: She is alert. She is disoriented.       Significant Labs: All pertinent labs within the past 24 hours have been reviewed.    Significant Imaging: I have reviewed all pertinent imaging results/findings within the past 24 hours.

## 2022-09-15 NOTE — CONSULTS
[facility] - Emergency Dept.  Cardiothoracic Surgery  Consult Note    Patient Name: Nargis Pastrana  MRN: [id number]  Admission Date: 9/15/2022  Attending Physician: Jose Daniel Soto MD  Referring Provider: Self, Aaareferral    Patient information was obtained from ER records.     Consults  Subjective:     Chief Complaint/Reason for Admission:  Patient admitted for altered mental status on workup CT scan showed a small right apical pneumothorax which was not picked up on the examination the patient is not having any symptoms of dyspnea.    History of Present Illness: Patient is severely hard of hearing and unable to obtained a detailed history.  The patient is having altered mental status and he was seen in the emergency room and getting a complete workup for that.    No current facility-administered medications on file prior to encounter.     Current Outpatient Medications on File Prior to Encounter   Medication Sig    aspirin 81 MG Chew Take 81 mg by mouth once daily.    calcium carbonate (OS-LEE ANN) 600 mg (1,500 mg) Tab Take 600 mg by mouth 2 (two) times daily with meals.    citalopram (CELEXA) 20 MG tablet Take 20 mg by mouth once daily.    desoximetasone (TOPICORT) 0.05 % cream Apply topically 2 (two) times daily.    hydrocodone-acetaminophen (VICODIN) 5-500 mg per tablet Take 1 tablet by mouth every 8 (eight) hours as needed for Pain.    mirtazapine (REMERON) 30 MG tablet Take 30 mg by mouth every evening.    multivitamin capsule Take 1 capsule by mouth once daily.    ondansetron (ZOFRAN-ODT) 4 MG TbDL Take 1 tablet (4 mg total) by mouth every 8 (eight) hours as needed (nausea/vomiting).    pravastatin (PRAVACHOL) 40 MG tablet Take 40 mg by mouth once daily.       Review of patient's allergies indicates:  No Known Allergies    Past Medical History:   Diagnosis Date    Depression     Glaucoma (increased eye pressure)     Hyperlipemia     Renal disorder      Past Surgical History:   Procedure Laterality Date    APPENDECTOMY       EYE SURGERY      HYSTERECTOMY       Family History       Family history is unknown by patient.          Tobacco Use    Smoking status: Never    Smokeless tobacco: Never   Substance and Sexual Activity    Alcohol use: No    Drug use: No    Sexual activity: Not on file     Review of Systems   Unable to perform ROS: Age   Objective:     Vital Signs (Most Recent):  Temp: 97.8 °F (36.6 °C) (09/15/22 0902)  Pulse: 90 (09/15/22 1300)  Resp: 19 (09/15/22 1300)  BP: 125/65 (09/15/22 1300)  SpO2: 96 % (09/15/22 1300) Vital Signs (24h Range):  Temp:  [97.8 °F (36.6 °C)-98.7 °F (37.1 °C)] 97.8 °F (36.6 °C)  Pulse:  [] 90  Resp:  [14-28] 19  SpO2:  [88 %-99 %] 96 %  BP: ()/(51-93) 125/65     Weight: 69.9 kg (154 lb)  Body mass index is 24.86 kg/m².    SpO2: 96 %  O2 Device (Oxygen Therapy): nasal cannula     Intake/Output - Last 3 Shifts       None             Lines/Drains/Airways       Peripheral Intravenous Line  Duration                  Peripheral IV - Single Lumen 09/15/22 0541 20 G Left Forearm <1 day                    Physical Exam  Vitals and nursing note reviewed.   Constitutional:       Appearance: Normal appearance.   HENT:      Head: Normocephalic and atraumatic.      Nose: Congestion present.      Mouth/Throat:      Mouth: Mucous membranes are moist.   Eyes:      Extraocular Movements: Extraocular movements intact.      Pupils: Pupils are equal, round, and reactive to light.   Cardiovascular:      Rate and Rhythm: Normal rate and regular rhythm.   Pulmonary:      Effort: Pulmonary effort is normal.      Breath sounds: Normal breath sounds.   Abdominal:      Palpations: Abdomen is soft.   Musculoskeletal:      Cervical back: Normal range of motion and neck supple.   Skin:     General: Skin is warm.      Capillary Refill: Capillary refill takes less than 2 seconds.   Neurological:      General: No focal deficit present.      Mental Status: Mental status is at baseline.       Significant Labs:  BMP:    Recent Labs   Lab 09/15/22  0731   *      K 4.2   *   CO2 18*   BUN 34*   CREATININE 1.5*   CALCIUM 9.1     CBC:   Recent Labs   Lab 09/15/22  0640   WBC 12.97*   RBC 3.73*   HGB 11.0*   HCT 34.6*      MCV 93   MCH 29.5   MCHC 31.8*       Significant Diagnostics:  CT: I have reviewed all pertinent results/findings within the past 24 hours  CT scan shows a small apical right-sided pneumothorax and emphysematous changes in the lungs    Assessment/Plan:   89-year-old female patient who was getting a workup for altered mental status incidental finding of a small apical pneumothorax on the CT scan patient does not have any symptoms from that.  Continue the workup and repeat another chest x-ray in 24 hours .  Incentive spirometry aggressive pulmonary toilet  Chest x-ray in a.m.    There are no hospital problems to display for this patient.      Thank you for your consult. I will follow-up with patient. Please contact us if you have any additional questions.    Jazmin Arzola MD  Cardiothoracic Surgery  [facility] - Emergency Dept.

## 2022-09-15 NOTE — ASSESSMENT & PLAN NOTE
Patient with acute kidney injury likely due to IVVD/dehydration TRAV is currently stable. Labs reviewed- Renal function/electrolytes with Estimated Creatinine Clearance: 17.1 mL/min (A) (based on SCr of 1.5 mg/dL (H)). according to latest data. Monitor urine output and serial BMP and adjust therapy as needed. Avoid nephrotoxins and renally dose meds for GFR listed above.     S/t above  IV fluids, trend Cr

## 2022-09-15 NOTE — PHARMACY MED REC
"Admission Medication History     The home medication history was taken by Javid Barragan.    You may go to "Admission" then "Reconcile Home Medications" tabs to review and/or act upon these items.     The home medication list has been updated by the Pharmacy department.   Please read ALL comments highlighted in yellow.   Please address this information as you see fit.    Feel free to contact us if you have any questions or require assistance.      The medications listed below were removed from the home medication list. Please reorder if appropriate:  Patient reports no longer taking the following medication(s):  ASPIRIN 81MG  CELEXA 20MG  PRAVACHOL 40MG  VICODIN 5-500MG  ZOFRAN ODT 4MG    Medications listed below were obtained from: Nursing home: [facility]  (Not in a hospital admission)      Javid Barragan  [id number]    Current Outpatient Medications on File Prior to Encounter   Medication Sig Dispense Refill Last Dose    docusate calcium (SURFAK) 240 mg capsule Take 240 mg by mouth once daily.   9/14/2022    latanoprost 0.005 % ophthalmic solution Place 1 drop into both eyes every evening.   9/14/2022    mirtazapine (REMERON) 7.5 MG Tab Take 7.5 mg by mouth every evening.   9/14/2022                           .        "

## 2022-09-15 NOTE — HPI
89 y.o. female patient with a PMHx of renal disorder and hyperlipidemia who presents to the Emergency Department for evaluation of AMS. Pt is a resident at [facility] who was sent to the ED by staff who state pt began suddenly moaning and rubbing epigastric area but not verbalizing a complaint. Staff at [facility] state this is not the pt's normal behavior. CT chest shows RLL consolidation and lung collapse. There is also small apical PTX, CTS consulted.

## 2022-09-15 NOTE — ED NOTES
"Pt no longer moaning, resting with eyes closed. Pt arousable to voice. Pt looked at RN and states " I dont hear well" . Pt following commands.  "

## 2022-09-15 NOTE — Clinical Note
Diagnosis: Pneumonia due to infectious organism, unspecified laterality, unspecified part of lung [[id number]]   Future Attending Provider: CHESTER NEWTON [[id number]]   Admitting Provider:: CHESTER NEWTON [[id number]]

## 2022-09-16 PROBLEM — R91.1 LUNG NODULE: Status: ACTIVE | Noted: 2022-09-16

## 2022-09-16 PROBLEM — Z51.5 PALLIATIVE CARE ENCOUNTER: Status: ACTIVE | Noted: 2022-09-16

## 2022-09-16 LAB
ALBUMIN SERPL BCP-MCNC: 2.7 G/DL (ref 3.5–5.2)
ALP SERPL-CCNC: 102 U/L (ref 55–135)
ALT SERPL W/O P-5'-P-CCNC: 14 U/L (ref 10–44)
ANION GAP SERPL CALC-SCNC: 7 MMOL/L (ref 8–16)
AST SERPL-CCNC: 19 U/L (ref 10–40)
BASOPHILS # BLD AUTO: 0.03 K/UL (ref 0–0.2)
BASOPHILS NFR BLD: 0.2 % (ref 0–1.9)
BILIRUB SERPL-MCNC: 0.5 MG/DL (ref 0.1–1)
BUN SERPL-MCNC: 34 MG/DL (ref 10–30)
CALCIUM SERPL-MCNC: 8.9 MG/DL (ref 8.7–10.5)
CHLORIDE SERPL-SCNC: 111 MMOL/L (ref 95–110)
CO2 SERPL-SCNC: 23 MMOL/L (ref 23–29)
CREAT SERPL-MCNC: 1.6 MG/DL (ref 0.5–1.4)
DIFFERENTIAL METHOD: ABNORMAL
EOSINOPHIL # BLD AUTO: 0.1 K/UL (ref 0–0.5)
EOSINOPHIL NFR BLD: 0.7 % (ref 0–8)
ERYTHROCYTE [DISTWIDTH] IN BLOOD BY AUTOMATED COUNT: 14.1 % (ref 11.5–14.5)
EST. GFR  (NO RACE VARIABLE): 28 ML/MIN/1.73 M^2
GLUCOSE SERPL-MCNC: 102 MG/DL (ref 70–110)
HCT VFR BLD AUTO: 32.8 % (ref 37–48.5)
HGB BLD-MCNC: 9.9 G/DL (ref 12–16)
IMM GRANULOCYTES # BLD AUTO: 0.05 K/UL (ref 0–0.04)
IMM GRANULOCYTES NFR BLD AUTO: 0.4 % (ref 0–0.5)
LYMPHOCYTES # BLD AUTO: 2.5 K/UL (ref 1–4.8)
LYMPHOCYTES NFR BLD: 20.5 % (ref 18–48)
MCH RBC QN AUTO: 28.4 PG (ref 27–31)
MCHC RBC AUTO-ENTMCNC: 30.2 G/DL (ref 32–36)
MCV RBC AUTO: 94 FL (ref 82–98)
MONOCYTES # BLD AUTO: 1.2 K/UL (ref 0.3–1)
MONOCYTES NFR BLD: 10.2 % (ref 4–15)
NEUTROPHILS # BLD AUTO: 8.2 K/UL (ref 1.8–7.7)
NEUTROPHILS NFR BLD: 68 % (ref 38–73)
NRBC BLD-RTO: 0 /100 WBC
PLATELET # BLD AUTO: 206 K/UL (ref 150–450)
PMV BLD AUTO: 9.7 FL (ref 9.2–12.9)
POTASSIUM SERPL-SCNC: 5.4 MMOL/L (ref 3.5–5.1)
PROT SERPL-MCNC: 6.3 G/DL (ref 6–8.4)
RBC # BLD AUTO: 3.49 M/UL (ref 4–5.4)
SODIUM SERPL-SCNC: 141 MMOL/L (ref 136–145)
WBC # BLD AUTO: 12.13 K/UL (ref 3.9–12.7)

## 2022-09-16 PROCEDURE — 63600175 PHARM REV CODE 636 W HCPCS: Performed by: INTERNAL MEDICINE

## 2022-09-16 PROCEDURE — 99223 1ST HOSP IP/OBS HIGH 75: CPT | Mod: ,,, | Performed by: PHYSICIAN ASSISTANT

## 2022-09-16 PROCEDURE — 27000221 HC OXYGEN, UP TO 24 HOURS

## 2022-09-16 PROCEDURE — 63600175 PHARM REV CODE 636 W HCPCS: Performed by: NURSE PRACTITIONER

## 2022-09-16 PROCEDURE — 36415 COLL VENOUS BLD VENIPUNCTURE: CPT | Performed by: STUDENT IN AN ORGANIZED HEALTH CARE EDUCATION/TRAINING PROGRAM

## 2022-09-16 PROCEDURE — 92610 EVALUATE SWALLOWING FUNCTION: CPT

## 2022-09-16 PROCEDURE — 99497 ADVNCD CARE PLAN 30 MIN: CPT | Mod: 25,,, | Performed by: PHYSICIAN ASSISTANT

## 2022-09-16 PROCEDURE — 99900035 HC TECH TIME PER 15 MIN (STAT)

## 2022-09-16 PROCEDURE — 97166 OT EVAL MOD COMPLEX 45 MIN: CPT

## 2022-09-16 PROCEDURE — 94664 DEMO&/EVAL PT USE INHALER: CPT

## 2022-09-16 PROCEDURE — 99223 PR INITIAL HOSPITAL CARE,LEVL III: ICD-10-PCS | Mod: ,,, | Performed by: PHYSICIAN ASSISTANT

## 2022-09-16 PROCEDURE — 27000646 HC AEROBIKA DEVICE

## 2022-09-16 PROCEDURE — 99223 1ST HOSP IP/OBS HIGH 75: CPT | Mod: ,,, | Performed by: INTERNAL MEDICINE

## 2022-09-16 PROCEDURE — 11000001 HC ACUTE MED/SURG PRIVATE ROOM

## 2022-09-16 PROCEDURE — S0030 INJECTION, METRONIDAZOLE: HCPCS | Performed by: NURSE PRACTITIONER

## 2022-09-16 PROCEDURE — 85025 COMPLETE CBC W/AUTO DIFF WBC: CPT | Performed by: STUDENT IN AN ORGANIZED HEALTH CARE EDUCATION/TRAINING PROGRAM

## 2022-09-16 PROCEDURE — 97163 PT EVAL HIGH COMPLEX 45 MIN: CPT

## 2022-09-16 PROCEDURE — 25000003 PHARM REV CODE 250: Performed by: STUDENT IN AN ORGANIZED HEALTH CARE EDUCATION/TRAINING PROGRAM

## 2022-09-16 PROCEDURE — 99223 PR INITIAL HOSPITAL CARE,LEVL III: ICD-10-PCS | Mod: ,,, | Performed by: INTERNAL MEDICINE

## 2022-09-16 PROCEDURE — 97530 THERAPEUTIC ACTIVITIES: CPT

## 2022-09-16 PROCEDURE — 80053 COMPREHEN METABOLIC PANEL: CPT | Performed by: STUDENT IN AN ORGANIZED HEALTH CARE EDUCATION/TRAINING PROGRAM

## 2022-09-16 PROCEDURE — 25000003 PHARM REV CODE 250: Performed by: NURSE PRACTITIONER

## 2022-09-16 PROCEDURE — 94761 N-INVAS EAR/PLS OXIMETRY MLT: CPT

## 2022-09-16 PROCEDURE — 99497 PR ADVNCD CARE PLAN 30 MIN: ICD-10-PCS | Mod: 25,,, | Performed by: PHYSICIAN ASSISTANT

## 2022-09-16 RX ORDER — SODIUM CHLORIDE, SODIUM LACTATE, POTASSIUM CHLORIDE, CALCIUM CHLORIDE 600; 310; 30; 20 MG/100ML; MG/100ML; MG/100ML; MG/100ML
INJECTION, SOLUTION INTRAVENOUS CONTINUOUS
Status: DISCONTINUED | OUTPATIENT
Start: 2022-09-16 | End: 2022-09-19 | Stop reason: HOSPADM

## 2022-09-16 RX ORDER — METRONIDAZOLE 500 MG/100ML
500 INJECTION, SOLUTION INTRAVENOUS
Status: DISCONTINUED | OUTPATIENT
Start: 2022-09-16 | End: 2022-09-16

## 2022-09-16 RX ADMIN — SODIUM CHLORIDE, SODIUM LACTATE, POTASSIUM CHLORIDE, AND CALCIUM CHLORIDE: .6; .31; .03; .02 INJECTION, SOLUTION INTRAVENOUS at 01:09

## 2022-09-16 RX ADMIN — CEFTRIAXONE 1 G: 1 INJECTION, SOLUTION INTRAVENOUS at 09:09

## 2022-09-16 RX ADMIN — LATANOPROST 1 DROP: 50 SOLUTION OPHTHALMIC at 08:09

## 2022-09-16 RX ADMIN — METRONIDAZOLE 500 MG: 5 INJECTION, SOLUTION INTRAVENOUS at 10:09

## 2022-09-16 RX ADMIN — AZITHROMYCIN MONOHYDRATE 500 MG: 500 INJECTION, POWDER, LYOPHILIZED, FOR SOLUTION INTRAVENOUS at 10:09

## 2022-09-16 NOTE — SUBJECTIVE & OBJECTIVE
Interval History: Mental status has improved per sons, but responses are still slowed.     Review of Systems   Unable to perform ROS: Mental status change   Objective:     Vital Signs (Most Recent):  Temp: 98.9 °F (37.2 °C) (09/16/22 0653)  Pulse: 79 (09/16/22 0653)  Resp: 20 (09/16/22 0653)  BP: 119/64 (09/16/22 0653)  SpO2: 98 % (09/16/22 0653) Vital Signs (24h Range):  Temp:  [97.6 °F (36.4 °C)-98.9 °F (37.2 °C)] 98.9 °F (37.2 °C)  Pulse:  [53-93] 79  Resp:  [16-20] 20  SpO2:  [92 %-99 %] 98 %  BP: (112-190)/(64-95) 119/64     Weight: 55.8 kg (123 lb 0.3 oz)  Body mass index is 19.86 kg/m².    Intake/Output Summary (Last 24 hours) at 9/16/2022 1140  Last data filed at 9/16/2022 1037  Gross per 24 hour   Intake 1247.47 ml   Output 200 ml   Net 1047.47 ml      Physical Exam  Constitutional:       General: She is not in acute distress.     Appearance: Normal appearance. She is ill-appearing.   HENT:      Head: Normocephalic and atraumatic.      Mouth/Throat:      Mouth: Mucous membranes are dry.   Cardiovascular:      Rate and Rhythm: Normal rate and regular rhythm.   Pulmonary:      Effort: Pulmonary effort is normal.      Breath sounds: Normal breath sounds.      Comments: Coarse breath sounds   Abdominal:      General: Abdomen is flat. There is no distension.      Palpations: Abdomen is soft.      Tenderness: There is no abdominal tenderness. There is no guarding.   Musculoskeletal:         General: No swelling. Normal range of motion.      Right lower leg: No edema.      Left lower leg: No edema.   Skin:     General: Skin is warm and dry.   Neurological:      Mental Status: She is alert. She is disoriented.         Significant Labs: All pertinent labs within the past 24 hours have been reviewed.  CBC:   Recent Labs   Lab 09/15/22  0640 09/16/22  0522   WBC 12.97* 12.13   HGB 11.0* 9.9*   HCT 34.6* 32.8*    206     CMP:   Recent Labs   Lab 09/15/22  0731 09/16/22  0522    141   K 4.2 5.4*   *  111*   CO2 18* 23   * 102   BUN 34* 34*   CREATININE 1.5* 1.6*   CALCIUM 9.1 8.9   PROT 7.4 6.3   ALBUMIN 3.0* 2.7*   BILITOT 0.4 0.5   ALKPHOS 106 102   AST 21 19   ALT 17 14   ANIONGAP 10 7*       Significant Imaging:   XR CHEST 1 VIEW     CLINICAL HISTORY:  right sided pneumothorax;Pneumothorax, unspecified     FINDINGS:  Single view of the chest.  Comparison 09/15/2022     Cardiac silhouette is normal.  The lungs demonstrate no evidence of active disease.  Right lower lobe atelectasis.  Tiny right apical pneumothorax.  Trace effusion on the right.  Low lung volumes limits evaluation.  Bones appear intact.  Moderate degenerative change.  Aorta demonstrates atherosclerotic disease.     Impression:     See findings above    MRI BRAIN WITHOUT CONTRAST     CLINICAL HISTORY:  Aphasia.  TIA.  Mental status change, unknown cause;     TECHNIQUE:  Standard multiplanar noncontrast sequences of the brain.     COMPARISON:  CT brain 09/15/2022.     FINDINGS:  The ventricles are moderately enlarged as are the extra-axial CSF spaces.     There is moderately extensive white matter hyperintensity, more pronounced in the frontal lobe distribution.     The gradient echo sequence is negative.  No evidence of chronic hemorrhage.     No extra-axial fluid collection is seen.     Skull base appears normal.     The diffusion sequence is negative.     Impression:     No acute or focal process.  Moderately advanced atrophy and white matter degeneration.    CT CHEST ABDOMEN PELVIS WITHOUT CONTRAST(XPD)     CLINICAL HISTORY:  Sepsis;complaining of chest and abdomen pain. Dementia. can't provide accurate history;     TECHNIQUE:  Low dose axial images, sagittal and coronal reformations were obtained from the thoracic inlet to the pubic synthesis .  Oral contrast was not administered. All CT scans at this facility are performed using dose optimization techniques including the following: automated exposure control; adjustment of the mA  and/or kV; use of iterative reconstruction technique.     COMPARISON:  Chest radiograph 09/15/2022     FINDINGS:  Beam hardening artifact, patient motion, noncontrast technique somewhat limits evaluation.     Thoracic soft tissues: No significant abnormality.     Aorta: Mildly tortuous, however normal in caliber.  There is severe atherosclerotic calcification.  Significant calcification additionally present at the origin of the left subclavian artery.     Heart: Normal in size small pericardial effusion.  There is severe coronary artery atherosclerosis.  There is significant calcification of the aortic valve.  Additional calcification present at the mitral valve annulus.     Jasmyn/Mediastinum: No significant lymphadenopathy     Lungs: There is a small right pneumothorax.  There is near complete collapse of the right lower lobe with superimposed consolidation.  Opacity present within the right lower lobe bronchial tree.  There is mild ground-glass density within the right middle lobe, which may be due to atelectatic change versus additional infiltrate.  Within the posterior right upper lobe lies a partially calcified 1.1 x 1.3 cm nodule.  The left lung demonstrates dependent density within the posterior aspect of the left lower lobe which may be due to atelectatic change or additional site of infiltrate.  There is a noncalcified 1.0 cm nodule within the lingula (axial series 6, image 208).  There is trace bilateral pleural fluid, right more than left.  No left pneumothorax.  Bronchial tree on the left appears patent..     Liver: Appears normal in size and attenuation without concerning focal hepatic abnormality within the limits of noncontrast technique and beam hardening artifact.  Calcified granulomas noted.     Gallbladder: No calcified gallstones.     Bile Ducts: No evidence of dilated ducts.     Pancreas: No mass or peripancreatic fat stranding.     Spleen: Unremarkable.     Adrenals: Unremarkable.     Kidneys/  Ureters: Kidneys are small in size with a lobular contour, normal invade location.  No nephrolithiasis or hydronephrosis.  Ureters are normal in course and caliber.  Normal concentration and excretion of contrast. No hydronephrosis or nephrolithiasis. No ureteral dilatation.     Bladder: No evidence of wall thickening.     Reproductive organs: Unremarkable.     GI Tract/Mesentery: Small hiatal hernia.  No evidence of bowel obstruction or inflammation. Significant volume stool within the rectum.  No inflammatory change in the expected region of the appendix to indicate appendicitis.  There are few colonic diverticula without inflammatory change of diverticulitis.  Portions of the sigmoid colon are decompressed with mild mural prominence, suspected related to decompression, though underlying lesion cannot be excluded at these sites, particularly at the rectosigmoid junction (axial series 3, image 140).  Correlate with recent colonoscopy.     Peritoneal Space: No ascites. No free air.     Retroperitoneum: No significant adenopathy.     Abdominal wall: Small fat containing umbilical and right inguinal hernias.     Vasculature: There is severe aortic atherosclerosis.  No aneurysm.     Bones: No acute fracture. There is osteopenia and degenerative change of the spine noting grade 1 retrolisthesis of L2 on L3.  There is mild deformity of the mid sternal body with slightly increased sclerosis at this site.  Correlate for prior injury.     Impression:     Near complete collapse of the right lower lobe with superimposed consolidation, concerning for aspiration or pneumonia.  Opacity present within the right lower lobe bronchial tree, concerning for aspiration, mucous plugging, or endobronchial lesion.     Small right pneumothorax.     Mild deformity of the mid sternal body with slightly increased sclerosis at this site.  Correlate for prior injury.  No definite acute fracture seen.  Underlying osseous lesion cannot be  excluded.     Bilateral pulmonary nodules with the largest measuring 1.3 cm on the right, partially calcified.  A noncalcified 1.0 cm nodule present within the lingula.  For a solid nodule >8 mm, Fleischner Society 2017 guidelines recommend considering CT, PET/CT or tissue sampling at 3 months.     Additional findings as above.     Findings discussed with Dr. Soto over the telephone at 10:00 on 09/15/2022.

## 2022-09-16 NOTE — SUBJECTIVE & OBJECTIVE
Past Medical History:   Diagnosis Date    Depression     Glaucoma (increased eye pressure)     Hyperlipemia     Renal disorder        Past Surgical History:   Procedure Laterality Date    APPENDECTOMY      EYE SURGERY      HYSTERECTOMY         Review of patient's allergies indicates:  No Known Allergies    Family History       Family history is unknown by patient.          Tobacco Use    Smoking status: Never    Smokeless tobacco: Never   Substance and Sexual Activity    Alcohol use: No    Drug use: No    Sexual activity: Not on file         Review of Systems   Constitutional:  Positive for activity change, appetite change and fatigue. Negative for chills, fever and unexpected weight change.   HENT:  Negative for drooling, ear discharge and nosebleeds.    Eyes:  Negative for pain, discharge and itching.   Respiratory:  Positive for cough and shortness of breath. Negative for choking.    Cardiovascular:  Negative for chest pain.   Gastrointestinal:  Negative for anal bleeding.   Endocrine: Negative for cold intolerance.   Genitourinary:  Negative for hematuria.   Musculoskeletal:  Positive for arthralgias, back pain and gait problem. Negative for neck stiffness.   Skin:  Negative for rash.   Allergic/Immunologic: Negative for immunocompromised state.   Neurological:  Positive for weakness. Negative for seizures and facial asymmetry.   Hematological:  Negative for adenopathy.   Psychiatric/Behavioral:  Negative for behavioral problems, self-injury and suicidal ideas.         Nonverbal    Objective:     Vital Signs (Most Recent):  Temp: 97.4 °F (36.3 °C) (09/16/22 1204)  Pulse: 73 (09/16/22 1204)  Resp: 20 (09/16/22 1204)  BP: (!) 118/59 (09/16/22 1204)  SpO2: 98 % (09/16/22 1204)   Vital Signs (24h Range):  Temp:  [97.4 °F (36.3 °C)-98.9 °F (37.2 °C)] 97.4 °F (36.3 °C)  Pulse:  [53-93] 73  Resp:  [16-20] 20  SpO2:  [92 %-99 %] 98 %  BP: (112-190)/(59-95) 118/59     Weight: 55.8 kg (123 lb 0.3 oz)  Body mass index is  19.86 kg/m².      Intake/Output Summary (Last 24 hours) at 9/16/2022 1223  Last data filed at 9/16/2022 1037  Gross per 24 hour   Intake 1247.47 ml   Output 200 ml   Net 1047.47 ml       Physical Exam  Vitals and nursing note reviewed.   Constitutional:       General: She is not in acute distress.     Appearance: She is well-developed. She is ill-appearing and toxic-appearing.   HENT:      Head: Normocephalic and atraumatic.      Nose: Nose normal.   Eyes:      Extraocular Movements: Extraocular movements intact.   Cardiovascular:      Rate and Rhythm: Normal rate and regular rhythm.   Pulmonary:      Effort: Pulmonary effort is normal. No respiratory distress.      Breath sounds: No stridor.   Abdominal:      General: There is no distension.   Genitourinary:     Comments: External urine catheter  Musculoskeletal:         General: No signs of injury.      Cervical back: Normal range of motion and neck supple.   Skin:     General: Skin is warm and dry.   Neurological:      General: No focal deficit present.      Mental Status: She is alert.      Comments: Nonverbal awake   Psychiatric:         Behavior: Behavior normal.       Vents:       Lines/Drains/Airways       Drain  Duration             Female External Urinary Catheter 09/16/22 0818 <1 day              Peripheral Intravenous Line  Duration                  Peripheral IV - Single Lumen 09/15/22 0541 20 G Left Forearm 1 day                    Significant Labs:    CBC/Anemia Profile:  Recent Labs   Lab 09/15/22  0640 09/16/22  0522   WBC 12.97* 12.13   HGB 11.0* 9.9*   HCT 34.6* 32.8*    206   MCV 93 94   RDW 14.0 14.1        Chemistries:  Recent Labs   Lab 09/15/22  0731 09/16/22  0522    141   K 4.2 5.4*   * 111*   CO2 18* 23   BUN 34* 34*   CREATININE 1.5* 1.6*   CALCIUM 9.1 8.9   ALBUMIN 3.0* 2.7*   PROT 7.4 6.3   BILITOT 0.4 0.5   ALKPHOS 106 102   ALT 17 14   AST 21 19       MRI brain 09/15/2022 no acute finding          Significant  Imaging:     CT chest abdomen pelvis 09/15/2022    EXAMINATION:  CT CHEST ABDOMEN PELVIS WITHOUT CONTRAST(XPD)     CLINICAL HISTORY:  Sepsis;complaining of chest and abdomen pain. Dementia. can't provide accurate history;     TECHNIQUE:  Low dose axial images, sagittal and coronal reformations were obtained from the thoracic inlet to the pubic synthesis .  Oral contrast was not administered. All CT scans at this facility are performed using dose optimization techniques including the following: automated exposure control; adjustment of the mA and/or kV; use of iterative reconstruction technique.     COMPARISON:  Chest radiograph 09/15/2022     FINDINGS:  Beam hardening artifact, patient motion, noncontrast technique somewhat limits evaluation.     Thoracic soft tissues: No significant abnormality.     Aorta: Mildly tortuous, however normal in caliber.  There is severe atherosclerotic calcification.  Significant calcification additionally present at the origin of the left subclavian artery.     Heart: Normal in size small pericardial effusion.  There is severe coronary artery atherosclerosis.  There is significant calcification of the aortic valve.  Additional calcification present at the mitral valve annulus.     Jasmyn/Mediastinum: No significant lymphadenopathy     Lungs: There is a small right pneumothorax.  There is near complete collapse of the right lower lobe with superimposed consolidation.  Opacity present within the right lower lobe bronchial tree.  There is mild ground-glass density within the right middle lobe, which may be due to atelectatic change versus additional infiltrate.  Within the posterior right upper lobe lies a partially calcified 1.1 x 1.3 cm nodule.  The left lung demonstrates dependent density within the posterior aspect of the left lower lobe which may be due to atelectatic change or additional site of infiltrate.  There is a noncalcified 1.0 cm nodule within the lingula (axial series 6,  image 208).  There is trace bilateral pleural fluid, right more than left.  No left pneumothorax.  Bronchial tree on the left appears patent..     Liver: Appears normal in size and attenuation without concerning focal hepatic abnormality within the limits of noncontrast technique and beam hardening artifact.  Calcified granulomas noted.     Gallbladder: No calcified gallstones.     Bile Ducts: No evidence of dilated ducts.     Pancreas: No mass or peripancreatic fat stranding.     Spleen: Unremarkable.     Adrenals: Unremarkable.     Kidneys/ Ureters: Kidneys are small in size with a lobular contour, normal invade location.  No nephrolithiasis or hydronephrosis.  Ureters are normal in course and caliber.  Normal concentration and excretion of contrast. No hydronephrosis or nephrolithiasis. No ureteral dilatation.     Bladder: No evidence of wall thickening.     Reproductive organs: Unremarkable.     GI Tract/Mesentery: Small hiatal hernia.  No evidence of bowel obstruction or inflammation. Significant volume stool within the rectum.  No inflammatory change in the expected region of the appendix to indicate appendicitis.  There are few colonic diverticula without inflammatory change of diverticulitis.  Portions of the sigmoid colon are decompressed with mild mural prominence, suspected related to decompression, though underlying lesion cannot be excluded at these sites, particularly at the rectosigmoid junction (axial series 3, image 140).  Correlate with recent colonoscopy.     Peritoneal Space: No ascites. No free air.     Retroperitoneum: No significant adenopathy.     Abdominal wall: Small fat containing umbilical and right inguinal hernias.     Vasculature: There is severe aortic atherosclerosis.  No aneurysm.     Bones: No acute fracture. There is osteopenia and degenerative change of the spine noting grade 1 retrolisthesis of L2 on L3.  There is mild deformity of the mid sternal body with slightly increased  sclerosis at this site.  Correlate for prior injury.     Impression:     Near complete collapse of the right lower lobe with superimposed consolidation, concerning for aspiration or pneumonia.  Opacity present within the right lower lobe bronchial tree, concerning for aspiration, mucous plugging, or endobronchial lesion.     Small right pneumothorax.     Mild deformity of the mid sternal body with slightly increased sclerosis at this site.  Correlate for prior injury.  No definite acute fracture seen.  Underlying osseous lesion cannot be excluded.     Bilateral pulmonary nodules with the largest measuring 1.3 cm on the right, partially calcified.  A noncalcified 1.0 cm nodule present within the lingula.  For a solid nodule >8 mm, Fleischner Society 2017 guidelines recommend considering CT, PET/CT or tissue sampling at 3 months.

## 2022-09-16 NOTE — PROGRESS NOTES
[facility] - Med Surg [address] [place] [address] Medicine  Progress Note    Patient Name: Nargis Pastrana  MRN: [id number]  Patient Class: OP- Observation   Admission Date: 9/15/2022  Length of Stay: 0 days  Attending Physician: Jaime Cruz MD  Primary Care Provider: Paul Modi MD    Subjective:     Principal Problem:Right lower lobe pneumonia        HPI:  89 y.o. female patient with a PMHx of renal disorder and hyperlipidemia who presents to the Emergency Department for evaluation of AMS. Pt is a resident at [facility] who was sent to the ED by staff who state pt began suddenly moaning and rubbing epigastric area but not verbalizing a complaint. Staff at [facility] state this is not the pt's normal behavior. CT chest shows RLL consolidation and lung collapse. There is also small apical PTX, CTS consulted.       Overview/Hospital Course:  Nargis Pastrana is a 89-year-old female who was admitted to [facility] for metabolic encephalopathy and severe right lower lobe pneumonia.  CT showed a near collapse of the right lung due to severe consolidation as well as opacity in the right lower lobe which could represent aspiration or mucus plugging. Incidental finding of 1cm right upper lobe nodule. Seen by pulmonology who recommended SLP and palliative care consult due to concern of chronic aspiration. Will continue IV abx. Small uncomplicated pneumothorax. Patient was seen by CT surgery who repeated chest x-ray today which demonstrated a tiny apical pneumothorax.  Surgical intervention was not recommended.  CVA or other neurologic lesion ruled out by MRI.       Interval History: Mental status has improved per sons, but responses are still slowed.     Review of Systems   Unable to perform ROS: Mental status change   Objective:     Vital Signs (Most Recent):  Temp: 98.9 °F (37.2 °C) (09/16/22 0653)  Pulse: 79 (09/16/22 0653)  Resp: 20 (09/16/22 0653)  BP: 119/64 (09/16/22 0653)  SpO2: 98 % (09/16/22 0653)  Vital Signs (24h Range):  Temp:  [97.6 °F (36.4 °C)-98.9 °F (37.2 °C)] 98.9 °F (37.2 °C)  Pulse:  [53-93] 79  Resp:  [16-20] 20  SpO2:  [92 %-99 %] 98 %  BP: (112-190)/(64-95) 119/64     Weight: 55.8 kg (123 lb 0.3 oz)  Body mass index is 19.86 kg/m².    Intake/Output Summary (Last 24 hours) at 9/16/2022 1140  Last data filed at 9/16/2022 1037  Gross per 24 hour   Intake 1247.47 ml   Output 200 ml   Net 1047.47 ml      Physical Exam  Constitutional:       General: She is not in acute distress.     Appearance: Normal appearance. She is ill-appearing.   HENT:      Head: Normocephalic and atraumatic.      Mouth/Throat:      Mouth: Mucous membranes are dry.   Cardiovascular:      Rate and Rhythm: Normal rate and regular rhythm.   Pulmonary:      Effort: Pulmonary effort is normal.      Breath sounds: Normal breath sounds.      Comments: Coarse breath sounds   Abdominal:      General: Abdomen is flat. There is no distension.      Palpations: Abdomen is soft.      Tenderness: There is no abdominal tenderness. There is no guarding.   Musculoskeletal:         General: No swelling. Normal range of motion.      Right lower leg: No edema.      Left lower leg: No edema.   Skin:     General: Skin is warm and dry.   Neurological:      Mental Status: She is alert. She is disoriented.         Significant Labs: All pertinent labs within the past 24 hours have been reviewed.  CBC:   Recent Labs   Lab 09/15/22  0640 09/16/22  0522   WBC 12.97* 12.13   HGB 11.0* 9.9*   HCT 34.6* 32.8*    206     CMP:   Recent Labs   Lab 09/15/22  0731 09/16/22  0522    141   K 4.2 5.4*   * 111*   CO2 18* 23   * 102   BUN 34* 34*   CREATININE 1.5* 1.6*   CALCIUM 9.1 8.9   PROT 7.4 6.3   ALBUMIN 3.0* 2.7*   BILITOT 0.4 0.5   ALKPHOS 106 102   AST 21 19   ALT 17 14   ANIONGAP 10 7*       Significant Imaging:   XR CHEST 1 VIEW     CLINICAL HISTORY:  right sided pneumothorax;Pneumothorax, unspecified     FINDINGS:  Single view of  the chest.  Comparison 09/15/2022     Cardiac silhouette is normal.  The lungs demonstrate no evidence of active disease.  Right lower lobe atelectasis.  Tiny right apical pneumothorax.  Trace effusion on the right.  Low lung volumes limits evaluation.  Bones appear intact.  Moderate degenerative change.  Aorta demonstrates atherosclerotic disease.     Impression:     See findings above    MRI BRAIN WITHOUT CONTRAST     CLINICAL HISTORY:  Aphasia.  TIA.  Mental status change, unknown cause;     TECHNIQUE:  Standard multiplanar noncontrast sequences of the brain.     COMPARISON:  CT brain 09/15/2022.     FINDINGS:  The ventricles are moderately enlarged as are the extra-axial CSF spaces.     There is moderately extensive white matter hyperintensity, more pronounced in the frontal lobe distribution.     The gradient echo sequence is negative.  No evidence of chronic hemorrhage.     No extra-axial fluid collection is seen.     Skull base appears normal.     The diffusion sequence is negative.     Impression:     No acute or focal process.  Moderately advanced atrophy and white matter degeneration.    CT CHEST ABDOMEN PELVIS WITHOUT CONTRAST(XPD)     CLINICAL HISTORY:  Sepsis;complaining of chest and abdomen pain. Dementia. can't provide accurate history;     TECHNIQUE:  Low dose axial images, sagittal and coronal reformations were obtained from the thoracic inlet to the pubic synthesis .  Oral contrast was not administered. All CT scans at this facility are performed using dose optimization techniques including the following: automated exposure control; adjustment of the mA and/or kV; use of iterative reconstruction technique.     COMPARISON:  Chest radiograph 09/15/2022     FINDINGS:  Beam hardening artifact, patient motion, noncontrast technique somewhat limits evaluation.     Thoracic soft tissues: No significant abnormality.     Aorta: Mildly tortuous, however normal in caliber.  There is severe atherosclerotic  calcification.  Significant calcification additionally present at the origin of the left subclavian artery.     Heart: Normal in size small pericardial effusion.  There is severe coronary artery atherosclerosis.  There is significant calcification of the aortic valve.  Additional calcification present at the mitral valve annulus.     Jasmyn/Mediastinum: No significant lymphadenopathy     Lungs: There is a small right pneumothorax.  There is near complete collapse of the right lower lobe with superimposed consolidation.  Opacity present within the right lower lobe bronchial tree.  There is mild ground-glass density within the right middle lobe, which may be due to atelectatic change versus additional infiltrate.  Within the posterior right upper lobe lies a partially calcified 1.1 x 1.3 cm nodule.  The left lung demonstrates dependent density within the posterior aspect of the left lower lobe which may be due to atelectatic change or additional site of infiltrate.  There is a noncalcified 1.0 cm nodule within the lingula (axial series 6, image 208).  There is trace bilateral pleural fluid, right more than left.  No left pneumothorax.  Bronchial tree on the left appears patent..     Liver: Appears normal in size and attenuation without concerning focal hepatic abnormality within the limits of noncontrast technique and beam hardening artifact.  Calcified granulomas noted.     Gallbladder: No calcified gallstones.     Bile Ducts: No evidence of dilated ducts.     Pancreas: No mass or peripancreatic fat stranding.     Spleen: Unremarkable.     Adrenals: Unremarkable.     Kidneys/ Ureters: Kidneys are small in size with a lobular contour, normal invade location.  No nephrolithiasis or hydronephrosis.  Ureters are normal in course and caliber.  Normal concentration and excretion of contrast. No hydronephrosis or nephrolithiasis. No ureteral dilatation.     Bladder: No evidence of wall thickening.     Reproductive organs:  Unremarkable.     GI Tract/Mesentery: Small hiatal hernia.  No evidence of bowel obstruction or inflammation. Significant volume stool within the rectum.  No inflammatory change in the expected region of the appendix to indicate appendicitis.  There are few colonic diverticula without inflammatory change of diverticulitis.  Portions of the sigmoid colon are decompressed with mild mural prominence, suspected related to decompression, though underlying lesion cannot be excluded at these sites, particularly at the rectosigmoid junction (axial series 3, image 140).  Correlate with recent colonoscopy.     Peritoneal Space: No ascites. No free air.     Retroperitoneum: No significant adenopathy.     Abdominal wall: Small fat containing umbilical and right inguinal hernias.     Vasculature: There is severe aortic atherosclerosis.  No aneurysm.     Bones: No acute fracture. There is osteopenia and degenerative change of the spine noting grade 1 retrolisthesis of L2 on L3.  There is mild deformity of the mid sternal body with slightly increased sclerosis at this site.  Correlate for prior injury.     Impression:     Near complete collapse of the right lower lobe with superimposed consolidation, concerning for aspiration or pneumonia.  Opacity present within the right lower lobe bronchial tree, concerning for aspiration, mucous plugging, or endobronchial lesion.     Small right pneumothorax.     Mild deformity of the mid sternal body with slightly increased sclerosis at this site.  Correlate for prior injury.  No definite acute fracture seen.  Underlying osseous lesion cannot be excluded.     Bilateral pulmonary nodules with the largest measuring 1.3 cm on the right, partially calcified.  A noncalcified 1.0 cm nodule present within the lingula.  For a solid nodule >8 mm, Fleischner Society 2017 guidelines recommend considering CT, PET/CT or tissue sampling at 3 months.     Additional findings as above.     Findings discussed  with Dr. Soto over the telephone at 10:00 on 09/15/2022.      Assessment/Plan:      * Right lower lobe pneumonia  S/t aspiration  Empiric Abx  ST to eval and treat    9/16/22  CT showed near complete collapse related to pneumonia and opacity in the right lower bronchial tree that could be related to aspiration or mucous plugging. Pulmonology has been consulted for further assistance.  --IV Azithromycin/Rocephin. Add Flagyl  --awaiting SLP evaluation    Lung nodule  1.1x 1.3 cm nodule  Pulmonology consulted.  monitor    Constipation  Enema for BM      TRAV (acute kidney injury)  Patient with acute kidney injury likely due to IVVD/dehydration TRAV is currently stable. Labs reviewed- Renal function/electrolytes with Estimated Creatinine Clearance: 16.1 mL/min (A) (based on SCr of 1.6 mg/dL (H)). according to latest data. Monitor urine output and serial BMP and adjust therapy as needed. Avoid nephrotoxins and renally dose meds for GFR listed above.     S/t above  IV fluids, trend Cr    9/16/22  Trends reviewed. Appears to at baseline.     Primary spontaneous pneumothorax  Small, apical  CTS consulted, f/u recs  Repeat CXR    9/16/22  CXR today reviewed without worsening. Surgical intervention not warranted. Recommends 2 week follow up    Acute metabolic encephalopathy  Suspect related to acute infection as symptom are gradually improving.   CT head showed possible chronic lesion but MRI negative.           VTE Risk Mitigation (From admission, onward)         Ordered     IP VTE HIGH RISK PATIENT  Once         09/15/22 1521     Place sequential compression device  Until discontinued         09/15/22 1521                Discharge Planning   TERE:      Code Status: DNR   Is the patient medically ready for discharge?:     Reason for patient still in hospital (select all that apply): Treatment             Gerard Perkins NP  Department of Hospital Medicine   [facility] - Med [facility] 3

## 2022-09-16 NOTE — PLAN OF CARE
Patient remains free of falls and injuries during shift. Occasional moaning and grimacing. Remains nonverbal. Telemonitoring in place. IV fluids running. Will continue to monitor.

## 2022-09-16 NOTE — HOSPITAL COURSE
Nargis Pastrana is a 89-year-old female who was admitted to [facility] for metabolic encephalopathy and severe right lower lobe pneumonia.  CT showed a near collapse of the right lung due to severe consolidation as well as opacity in the right lower lobe which could represent aspiration or mucus plugging. Incidental finding of 1cm right upper lobe nodule. Seen by pulmonology who recommended SLP and palliative care consult due to concern of chronic aspiration. Will continue IV abx. Small uncomplicated pneumothorax. Patient was seen by CT surgery who repeated chest x-ray today which demonstrated a tiny apical pneumothorax.  Surgical intervention was not recommended.  CVA or other neurologic lesion ruled out by MRI.    9/17: NAEO, afebrile. Continue PO intake per norm, planning to d/c 9/19 AM with hospice to NH. Abx treatment for aspiration PNA

## 2022-09-16 NOTE — PLAN OF CARE
Problem: Infection  Goal: Absence of Infection Signs and Symptoms  Outcome: Ongoing, Progressing     Problem: Adult Inpatient Plan of Care  Goal: Plan of Care Review  Outcome: Ongoing, Progressing  Goal: Patient-Specific Goal (Individualized)  Outcome: Ongoing, Progressing  Goal: Absence of Hospital-Acquired Illness or Injury  Outcome: Ongoing, Progressing  Goal: Optimal Comfort and Wellbeing  Outcome: Ongoing, Progressing  Goal: Readiness for Transition of Care  Outcome: Ongoing, Progressing     Problem: Fall Injury Risk  Goal: Absence of Fall and Fall-Related Injury  Outcome: Ongoing, Progressing     Problem: Fluid and Electrolyte Imbalance (Acute Kidney Injury/Impairment)  Goal: Fluid and Electrolyte Balance  Outcome: Ongoing, Progressing     Problem: Skin Injury Risk Increased  Goal: Skin Health and Integrity  Outcome: Ongoing, Progressing

## 2022-09-16 NOTE — PLAN OF CARE
OT pippa completed. Total A for rolling. Pt with limited verbalization. Discharge OT to [facility]. Recommends returning to basic nursing home.

## 2022-09-16 NOTE — PLAN OF CARE
[facility] 3  Discharge Final Note    Primary Care Provider: Paul Modi MD    Expected Discharge Date:     Final Discharge Note (most recent)       Final Note - 09/16/22 1504          Final Note    Assessment Type Final Discharge Note     Anticipated Discharge Disposition Hospice/Medical Facility        Post-Acute Status    Post-Acute Authorization Hospice     Hospice Status Pending education     Discharge Delays None known at this time                   Pt to DC back to [facility]. Pt referred to [facility].    Panfilo Shepherd LMSW 9/16/2022 3:07 PM

## 2022-09-16 NOTE — CONSULTS
Advance Care Planning    Consult Note  Palliative Medicine      Consult Requested By: Clotilde Perkins NP  Reason for Consult: Goals of care    SUBJECTIVE:     History of Present Illness:  Nargis Pastrana is a 89 y.o. year old with a history of CKD stage III who is a resident of [facility] who was sent to the ED as she started to moan and rub her stomach. Imaging noted RLL consolidation and near complete collapse of RLL. Pulmonology recommends avoiding invasive interventions and supportive care. ST evaluated and noted cornelia aspiration and NPO with alternate means of nutrition or pleasure feeds. Palliative Medicine was consulted to assist with goals of care discussion. I met son Haile at bedside. Ms Pastrana woke up when touched but would not talk and only smiled and coughed during our visit. We talked about the CT findings, dysphagia, why I do not recommend a PEG tube, comfort feeds, expected trajectory, and continued infections. Haile was understanding and does not want to place a feeding tube and reiterates DNR status. Ms. Pastrana has been bedbound for a year now but says that she still has all mental faculties intact. I recommend redirecting to comfort focused treatments and enrolling with hospice to maximize comfort. I also recommend comfort feeds but acknowledging the risks of continued aspiration. Haile expressed understanding and was open to hospice enrollment when they return to NH. He has a younger brother, Wilder, but says that he (Haile) is HCPOA.        Past Medical History:   Diagnosis Date    Depression     Glaucoma (increased eye pressure)     Hyperlipemia     Renal disorder      Past Surgical History:   Procedure Laterality Date    APPENDECTOMY      EYE SURGERY      HYSTERECTOMY       Family History   Family history unknown: Yes       Social History     Socioeconomic History    Marital status:    Tobacco Use    Smoking status: Never    Smokeless tobacco: Never   Substance and Sexual Activity    Alcohol  use: No    Drug use: No     Social Determinants of Health     Financial Resource Strain: Low Risk     Difficulty of Paying Living Expenses: Not hard at all   Food Insecurity: No Food Insecurity    Worried About Running Out of Food in the Last Year: Never true    Ran Out of Food in the Last Year: Never true   Transportation Needs: No Transportation Needs    Lack of Transportation (Medical): No    Lack of Transportation (Non-Medical): No   Physical Activity: Inactive    Days of Exercise per Week: 0 days    Minutes of Exercise per Session: 0 min   Stress: No Stress Concern Present    Feeling of Stress : Not at all   Social Connections: Socially Isolated    Frequency of Communication with Friends and Family: More than three times a week    Frequency of Social Gatherings with Friends and Family: More than three times a week    Attends Synagogue Services: Never    Active Member of Clubs or Organizations: No    Attends Club or Organization Meetings: Never    Marital Status:    Housing Stability: Low Risk     Unable to Pay for Housing in the Last Year: No    Number of Places Lived in the Last Year: 1    Unstable Housing in the Last Year: No      Review of patient's allergies indicates:  No Known Allergies    Medications:    Current Facility-Administered Medications:     acetaminophen tablet 650 mg, 650 mg, Oral, Q4H PRN, Jaime Cruz MD    acetaminophen tablet 650 mg, 650 mg, Oral, Q8H PRN, Jaime Cruz MD    azithromycin 500 mg in dextrose 5 % 250 mL IVPB (ready to mix system), 500 mg, Intravenous, Q24H, Gerard Perkins NP, Stopped at 09/16/22 1113    cefTRIAXone (ROCEPHIN) 1 g/50 mL D5W IVPB, 1 g, Intravenous, Q24H, Gerard Perkins NP, Stopped at 09/16/22 1010    glucagon (human recombinant) injection 1 mg, 1 mg, Intramuscular, PRN, Jaime Cruz MD    glucose chewable tablet 16 g, 16 g, Oral, PRN, Jaime Cruz MD    glucose chewable tablet 24 g, 24 g, Oral, PRN, Jaime Cruz MD    lactated  ringers infusion, , Intravenous, Continuous, Hernando Young MD, Last Rate: 75 mL/hr at 09/16/22 1333, New Bag at 09/16/22 1333    latanoprost 0.005 % ophthalmic solution 1 drop, 1 drop, Both Eyes, QHS, Jaime Cruz MD    melatonin tablet 6 mg, 6 mg, Oral, Nightly PRN, Jaime Cruz MD    mirtazapine tablet 7.5 mg, 7.5 mg, Oral, QHS, Jaime Cruz MD    naloxone 0.4 mg/mL injection 0.02 mg, 0.02 mg, Intravenous, PRN, Jaime Cruz MD    ondansetron injection 4 mg, 4 mg, Intravenous, Q8H PRN, Jaime Cruz MD    sodium chloride 0.9% flush 10 mL, 10 mL, Intravenous, Q12H PRN, Jaime Cruz MD    sodium phosphates 19-7 gram/118 mL enema 1 enema, 1 enema, Rectal, Once, Jaime Cruz MD    ROS:  Review of Systems   Unable to perform ROS: Dementia     OBJECTIVE:     Physical Exam:  Vitals: Temp: 97.4 °F (36.3 °C) (09/16/22 1204)  Pulse: 73 (09/16/22 1204)  Resp: 20 (09/16/22 1204)  BP: (!) 118/59 (09/16/22 1204)  SpO2: 98 % (09/16/22 1426)    Physical Exam  Vitals reviewed.   Constitutional:       General: She is not in acute distress.     Appearance: Normal appearance. She is well-developed. She is ill-appearing.   HENT:      Head: Normocephalic and atraumatic.   Eyes:      Conjunctiva/sclera: Conjunctivae normal.   Cardiovascular:      Rate and Rhythm: Normal rate and regular rhythm.      Heart sounds: Normal heart sounds. No murmur heard.  Pulmonary:      Effort: Pulmonary effort is normal. No respiratory distress.      Breath sounds: Normal breath sounds.   Abdominal:      General: Bowel sounds are normal. There is no distension.      Palpations: Abdomen is soft.   Musculoskeletal:      Right lower leg: No edema.      Left lower leg: No edema.   Skin:     General: Skin is warm and dry.      Findings: No rash.   Neurological:      Mental Status: She is alert.         Review of Symptoms      Symptom Assessment (ESAS 0-10 Scale)  Unable to complete assessment due to Dementia     CAM / Delirium:   Positive      Pain Assessment in Advanced Demential Scale (PAINAD)   Breathing - Independent of vocalization:  0  Negative vocalization:  0  Facial expression:  0  Body language:  0  Consolability:  0  Total:  0    Functional Assessment Scale (FAST):  7a    Living Arrangements:  Lives in assisted living    Advance Directives:   Living Will: No    LaPOST: No    Do Not Resuscitate Status: Yes      Decision Making:  Family answered questions and Patient unable to communicate due to disease severity/cognitive impairment    Labs:  WBC   Date Value Ref Range Status   09/16/2022 12.13 3.90 - 12.70 K/uL Final       Hemoglobin   Date Value Ref Range Status   09/16/2022 9.9 (L) 12.0 - 16.0 g/dL Final       Hematocrit   Date Value Ref Range Status   09/16/2022 32.8 (L) 37.0 - 48.5 % Final       MCV   Date Value Ref Range Status   09/16/2022 94 82 - 98 fL Final       Platelets   Date Value Ref Range Status   09/16/2022 206 150 - 450 K/uL Final       BMP  Lab Results   Component Value Date     09/16/2022    K 5.4 (H) 09/16/2022     (H) 09/16/2022    CO2 23 09/16/2022    BUN 34 (H) 09/16/2022    CREATININE 1.6 (H) 09/16/2022    CALCIUM 8.9 09/16/2022    ANIONGAP 7 (L) 09/16/2022    ESTGFRAFRICA 25 (A) 04/26/2020    EGFRNONAA 22 (A) 04/26/2020       Lab Results   Component Value Date    AST 19 09/16/2022    ALKPHOS 102 09/16/2022    BILITOT 0.5 09/16/2022       Albumin   Date Value Ref Range Status   09/16/2022 2.7 (L) 3.5 - 5.2 g/dL Final       Radiology:I have reviewed all pertinent imaging results/findings within the past 24 hours.  CT chest 9/15 reviewed    ASSESSMENT   Nargis Pastrana is a 89 y.o. year old with a history of CKD stage III who is a resident of [facility] who was sent to the ED as she started to moan and rub her stomach. Imaging noted RLL consolidation and near complete collapse of RLL. Pulmonology recommends avoiding invasive interventions and supportive care. ST evaluated and noted cornelia  aspiration and NPO with alternate means of nutrition or pleasure feeds. Palliative Medicine was consulted to assist with goals of care discussion.    PLAN   Encounter for Palliative Care  - Code status: DNR/I  - Surrogate: son Haile identifies as HCPOA  - Details of meeting in [facility]  - Primary outcome of meeting is to return to NH with hospice. No PEG tube and will continue pleasure feeds acknowledging the risks of aspiration.  - [facility] goes to her NH so will place referral    2. RLL pneumonia   - Aspiration suspected.  - No PEG tube and will continue pleasure feeds      Discussed case and visit details with Dr. Cruz, [facility] NP     Thank you for allowing Palliative Medicine to be involved in the care of Nargis Pastrana.         Medical decision making: HIGH based on high risk of death poor prognosis deescalating treatments management of more than one chronic illness in exacerbation or progression of disease    30min ACP time spent discussing: assessed patient specific goals and addressed the best way to achieve them, coordination of care and emotional support, formulating and communicating prognosis, exploring burden/ benefit of various approaches of treatment, inquired about existing advance directive documents.    Mary Olvera PA-C  Palliative Medicine

## 2022-09-16 NOTE — PLAN OF CARE
[facility] - Med Surg 3  Initial Discharge Assessment       Primary Care Provider: Paul Modi MD    Admission Diagnosis: Abnormal head CT [R93.0]  Acute respiratory failure with hypoxia [J96.01]  Chest pain [R07.9]  Pneumothorax, unspecified type [J93.9]  Pneumonia due to infectious organism, unspecified laterality, unspecified part of lung [J18.9]  AMS (altered mental status) [R41.82]    Admission Date: 9/15/2022  Expected Discharge Date:     Discharge Barriers Identified: None    Payor: MEDICARE / Plan: MEDICARE PART A & B / Product Type: Government /     Extended Emergency Contact Information  Primary Emergency Contact: Haile Pastrana  Address: [id number] [place] [address] [facility]  Home Phone: [phone]  Relation: Son  Secondary Emergency Contact: Hope Pastrana  Address: [address] [place] [address] [id number] [place] [address]  Mobile Phone: [phone]  Relation: Relative    Discharge Plan A: Return to nursing home  Discharge Plan B: Return to Nursing Home    No Pharmacies Listed    Initial Assessment (most recent)       Adult Discharge Assessment - 09/16/22 1242          Discharge Assessment    Assessment Type Discharge Planning Assessment     Confirmed/corrected address, phone number and insurance Yes     Confirmed Demographics Correct on Facesheet     Source of Information facility verbal report     Communicated TERE with patient/caregiver Date not available/Unable to determine     Lives With facility resident     Do you expect to return to your current living situation? Yes     Do you have help at home or someone to help you manage your care at home? Yes     Who are your caregiver(s) and their phone number(s)? Suhail [facility] ([facility]) [phone]     Prior to hospitilization cognitive status: Unable to Assess     Current cognitive status: Unable to Assess     Walking or Climbing Stairs Difficulty ambulation difficulty, requires  equipment;ambulation difficulty, dependent;stair climbing difficulty, assistance 1 person;stair climbing difficulty, dependent;transferring difficulty, assistance 1 person     Dressing/Bathing Difficulty bathing difficulty, dependent;bathing difficulty, assistance 1 person;bathing difficulty, requires equipment;dressing difficulty, assistance 1 person;dressing difficulty, dependent     Home Accessibility wheelchair accessible     Home Layout Able to live on 1st floor     Equipment Currently Used at Home wheelchair;hospital bed;shower chair     Readmission within 30 days? No     Patient currently being followed by outpatient case management? No     Do you currently have service(s) that help you manage your care at home? No     Do you take prescription medications? Yes     Do you have prescription coverage? Yes     Do you have any problems affording any of your prescribed medications? No     Is the patient taking medications as prescribed? yes     Who is going to help you get home at discharge? [facility] ([facility]) [phone]     How do you get to doctors appointments? agency     Are you on dialysis? No     Do you take coumadin? No     Discharge Plan A Return to nursing home     Discharge Plan B Return to Nursing Home     DME Needed Upon Discharge  none     Discharge Plan discussed with: Patient     Discharge Barriers Identified None        Physical Activity    On average, how many days per week do you engage in moderate to strenuous exercise (like a brisk walk)? 0 days     On average, how many minutes do you engage in exercise at this level? 0 min        Financial Resource Strain    How hard is it for you to pay for the very basics like food, housing, medical care, and heating? Not hard at all        Housing Stability    In the last 12 months, was there a time when you were not able to pay the mortgage or rent on time? No     In the last 12 months, how many places have you lived? 1     In the last 12 months, was there a  time when you did not have a steady place to sleep or slept in a shelter (including now)? No        Transportation Needs    In the past 12 months, has lack of transportation kept you from medical appointments or from getting medications? No     In the past 12 months, has lack of transportation kept you from meetings, work, or from getting things needed for daily living? No        Food Insecurity    Within the past 12 months, you worried that your food would run out before you got the money to buy more. Never true     Within the past 12 months, the food you bought just didn't last and you didn't have money to get more. Never true        Stress    Do you feel stress - tense, restless, nervous, or anxious, or unable to sleep at night because your mind is troubled all the time - these days? Not at all        Social Connections    In a typical week, how many times do you talk on the phone with family, friends, or neighbors? More than three times a week     How often do you get together with friends or relatives? More than three times a week     How often do you attend Mormonism or Protestant services? Never     Do you belong to any clubs or organizations such as Mormonism groups, unions, fraternal or athletic groups, or school groups? No     How often do you attend meetings of the clubs or organizations you belong to? Never     Are you , , , , never , or living with a partner?         Alcohol Use    Q1: How often do you have a drink containing alcohol? Never     Q2: How many drinks containing alcohol do you have on a typical day when you are drinking? Patient does not drink     Q3: How often do you have six or more drinks on one occasion? Never                   Spoke with pt's nurse Leighton at [facility] for DC assessment. Pt is total care and uses a , hospital bed, and shower chair. Pt can return to [facility] when cleared to PA.     Panfilo Shepherd LMSW 9/16/2022 12:52 PM

## 2022-09-16 NOTE — PROGRESS NOTES
Subjective:      Patient ID: Nargis Pastrana is a 89 y.o. female.    Chief Complaint: Altered Mental Status (Resident at [facility], pt suddenly started  moaning and not verbalizing a complaint, she is moaning and this is not her normal per staff, she is rubbing epigastric area o2 sat on scene 89, arrived on 4L NC)    HPI: Patient is severely hard of hearing and unable to obtain a detailed history.  The patient is having altered mental status and she was seen in the emergency room and getting a complete workup for that.Incidental finding of a tiny apical right pneumothorax.  Admitted for observation and the repeat cxr this morning shows non pregression of the pneumothorax. Her general condition does not show any change overnight.      Review of patient's allergies indicates:  No Known Allergies    Past Medical History:   Diagnosis Date    Depression     Glaucoma (increased eye pressure)     Hyperlipemia     Renal disorder        Family History   Family history unknown: Yes       Social History     Socioeconomic History    Marital status:    Tobacco Use    Smoking status: Never    Smokeless tobacco: Never   Substance and Sexual Activity    Alcohol use: No    Drug use: No       Past Surgical History:   Procedure Laterality Date    APPENDECTOMY      EYE SURGERY      HYSTERECTOMY         Review of Systems   Constitutional:  Positive for activity change and fatigue. Negative for appetite change.   HENT:  Negative for dental problem, nosebleeds and sore throat.    Eyes:  Negative for discharge and visual disturbance.   Respiratory:  Negative for cough, chest tightness and stridor.    Cardiovascular:  Negative for leg swelling.   Gastrointestinal:  Negative for abdominal distention and abdominal pain.   Genitourinary:  Negative for difficulty urinating and dysuria.   Musculoskeletal:  Negative for arthralgias, back pain and joint swelling.   Allergic/Immunologic: Negative for environmental allergies.  "  Neurological:  Negative for dizziness, syncope and headaches.   Hematological:  Does not bruise/bleed easily.   Psychiatric/Behavioral:  Positive for confusion and decreased concentration. Negative for behavioral problems.         Objective:   /64 (BP Location: Right arm, Patient Position: Lying)   Pulse 79   Temp 98.9 °F (37.2 °C) (Oral)   Resp 20   Ht 5' 6" (1.676 m)   Wt 55.8 kg (123 lb 0.3 oz)   LMP 06/22/1972   SpO2 98%   BMI 19.86 kg/m²     X-Ray Chest 1 View  Narrative: EXAMINATION:  XR CHEST 1 VIEW    CLINICAL HISTORY:  right sided pneumothorax;Pneumothorax, unspecified    FINDINGS:  Single view of the chest.  Comparison 09/15/2022    Cardiac silhouette is normal.  The lungs demonstrate no evidence of active disease.  Right lower lobe atelectasis.  Tiny right apical pneumothorax.  Trace effusion on the right.  Low lung volumes limits evaluation.  Bones appear intact.  Moderate degenerative change.  Aorta demonstrates atherosclerotic disease.  Impression: See findings above    Electronically signed by: Jose Daniel Pham MD  Date:    09/16/2022  Time:    07:11         Physical Exam  Vitals and nursing note reviewed.   Constitutional:       Appearance: She is ill-appearing.   HENT:      Head: Normocephalic and atraumatic.      Mouth/Throat:      Mouth: Mucous membranes are moist.   Eyes:      Extraocular Movements: Extraocular movements intact.      Pupils: Pupils are equal, round, and reactive to light.   Cardiovascular:      Rate and Rhythm: Normal rate and regular rhythm.      Pulses: Normal pulses.   Pulmonary:      Effort: Pulmonary effort is normal.      Breath sounds: Normal breath sounds.   Abdominal:      Palpations: Abdomen is soft.   Musculoskeletal:      Cervical back: Normal range of motion and neck supple.   Skin:     General: Skin is warm.      Capillary Refill: Capillary refill takes less than 2 seconds.   Neurological:      General: No focal deficit present.      Mental Status: She " is alert. She is disoriented.   CXR 9/16/2022 tiny right apical pneumothorax no progression from the last one    Assessment:     1. Pneumonia due to infectious organism, unspecified laterality, unspecified part of lung    2. AMS (altered mental status)    3. Pneumothorax, unspecified type    4. Abnormal head CT    5. Acute respiratory failure with hypoxia    6. Chest pain          Plan   Since the right apical pnemothorax is small and stable no treatment needed at this time.  Incentive spirometry  Rest of the management per the primary team  I will sign off today. Call me as needed.  Patient can follow up with me in office 2 weeks after discharge with a follow up CXR for the pneumothorax.          Jazmin Arzola MD  [facility]  [place]

## 2022-09-16 NOTE — PT/OT/SLP EVAL
"Occupational Therapy   Evaluation and Discharge Note    Name: Nargis Pastrana  MRN: [id number]  Admitting Diagnosis:  Right lower lobe pneumonia   Recent Surgery: * No surgery found *      Recommendations:     Discharge Recommendations: nursing facility, basic  Discharge Equipment Recommendations:  other (see comments) (TBD by next level of care)  Barriers to discharge:  None    Assessment:     Nargis Pastrana is a 89 y.o. female with a medical diagnosis of Right lower lobe pneumonia. At this time, patient is functioning at their prior level of function and does not require further acute OT services.     Plan:     During this hospitalization, patient does not require further acute OT services.  Please re-consult if situation changes.  Discharge OT to [facility].   Plan of Care Reviewed with: patient    Subjective     Chief Complaint: "I'm cold."  Patient/Family Comments/goals: none reported    Occupational Profile: Pt with minimal verbalization, unable to obtain detailed history  Living Environment: resident at [facility] per chart.   Previous level of function: unknown  Roles and Routines: unknown  Equipment Used at home:  other (see comments) (unknown)  Assistance upon Discharge: [facility]    Pain/Comfort:  Pain Rating 1: other (see comments) (no number reported)  Location 1: other (see comments) (Pt reporting pain/discomfort with IV site, stating "twice they stuck me.")    Objective:     Communicated with: nurse and epic chart review prior to session.  Patient found left sidelying with wedge under R side with blood pressure cuff, oxygen, PureWick, peripheral IV, telemetry, SCD upon OT entry to room.    General Precautions: Standard, fall, hearing impaired   Orthopedic Precautions:N/A   Braces: N/A  Respiratory Status: Nasal cannula, flow 2 L/min       Bed Mobility:    Patient completed Rolling/Turning to Left with  total assistance  Patient completed Rolling/Turning to Right with " total assistance    Cognitive/Visual Perceptual:  Cognitive/Psychosocial Skills:     -       Oriented to: Person   -       Follows Commands/attention:Follows one-step commands  -       Communication: minimal verbalization  -       Safety awareness/insight to disability: impaired     Physical Exam:  Upper Extremity Range of Motion:     -       Right Upper Extremity: limited AROM in shoulder, elbow WFL  -       Left Upper Extremity: limited AROM in shoulder, elbow WFL  Upper Extremity Strength:    -       Right Upper Extremity: 3-/5 in shoulder, 3/5 in elbow  -       Left Upper Extremity: 3-/5 in shoulder, 3/5 in elbow   Strength:    -       Right Upper Extremity: Deficits: poor, pt only using digits 1 & 2 to perform  -       Left Upper Extremity: poor, pt only using digits 1 & 2 to perform    AMPAC 6 Click ADL:  AMPAC Total Score: 7    Treatment & Education:  Eval limited by pt's AMS, pt with minimal verbalization. Pt able to follow single commands with extended time and visual/tactile demonstration. Pt hard of hearing. Patient educated on role of OT in acute setting and benefits of participation. Educated on using call button to meet needs.  Patient nodding head in agreement with POC.      Pt would not benefit from skilled acute OT services at this time. Discharge from OT services to [facility] to maintain ROM and strength. Recommends basic nursing home.     Patient left right sidelying with wedge under L side with all lines intact, call button in reach, and nurse notified    GOALS:   Multidisciplinary Problems       Occupational Therapy Goals       Not on file                    History:     Past Medical History:   Diagnosis Date    Depression     Glaucoma (increased eye pressure)     Hyperlipemia     Renal disorder          Past Surgical History:   Procedure Laterality Date    APPENDECTOMY      EYE SURGERY      HYSTERECTOMY         Time Tracking:     OT Date of Treatment: 09/16/22  OT Start Time:  0825  OT Stop Time: 0840  OT Total Time (min): 15 min    Billable Minutes:Evaluation 15    9/16/2022  Savannah Morgan, OT

## 2022-09-16 NOTE — ASSESSMENT & PLAN NOTE
Suspect related to acute infection as symptom are gradually improving.   CT head showed possible chronic lesion but MRI negative.

## 2022-09-16 NOTE — PT/OT/SLP EVAL
Speech Language Pathology Evaluation  Bedside Swallow    Patient Name:  Nargis Pastrana   MRN:  [id number]  Admitting Diagnosis: Right lower lobe pneumonia    Recommendations:                 General Recommendations:  Follow-up not indicated as pt dc with hospice/comfort care  Diet recommendations:   (comfort feeds),  (comfort feeds)   Aspiration Precautions: Frequent oral care   General Precautions: Standard, aspiration (Simultaneous filing. User may not have seen previous data.)  Communication strategies:  yes/no questions only    History:     Past Medical History:   Diagnosis Date    Depression     Glaucoma (increased eye pressure)     Hyperlipemia     Renal disorder        Past Surgical History:   Procedure Laterality Date    APPENDECTOMY      EYE SURGERY      HYSTERECTOMY         Social History: Patient lives at [facility].  Functional decline reported.  WC/bedbound and requires assist with ADL's.    Prior Intubation HX:  N/A    Modified Barium Swallow: N/A    Chest X-Rays: See medical chart.    Prior diet: Pt consumes a soft mechanical consistency diet at NH.  CT chest concerning for chronic aspiration vs mucous plug, causing lung collapse.    Subjective     Pt seen bedside for ST.  Awaker.  No c/o pain.  Nonverbal.  Not following commands.  Pt's sons present.  Patient goals: comfort    Pain/Comfort:  Pain Rating 1: 0/10 (Simultaneous filing. User may not have seen previous data.)  Pain Rating Post-Intervention 1: 0/10 (Simultaneous filing. User may not have seen previous data.)  Pain Rating 2: 0/10  Pain Rating Post-Intervention 2: 0/10 (Simultaneous filing. User may not have seen previous data.)    Respiratory Status: Nasal Cannula    Objective:     Oral Musculature Evaluation  Oral Musculature: general weakness, unable to assess due to poor participation/comprehension  Dentition: scattered dentition  Secretion Management: adequate  Mucosal Quality: good  Mandibular Strength and Mobility: impaired (unable to  masticate solids)  Lingual Strength and Mobility: impaired strength  Velar Elevation:  (nonverbal)  Buccal Strength and Mobility: decreased tone  Volitional Cough: present, productive  Volitional Swallow: present  Voice Prior to PO Intake: nonverbal    Bedside Swallow Eval:   Consistencies Assessed:  Pt consumed thin liquids via cup and straw, pureed and attempted soft solids (unable to masticate) during CSE.    Oral Phase:   Slow oral transit time  Unable to masticate solids (alejandro crackers)--removed from oral cavity by ST.    Pharyngeal Phase:   coughing/choking  delayed swallow initation  wet vocal quality after swallow    Compensatory Strategies  None  Pt cognitively unable to follow commands or utilize strategies.    Treatment: Pt exhibiting s/s of dysphagia with potential of chronic aspiration per CT chest.  Palliative consult warranted to determine goals of care. Pt/family opted for hospice and will d/c to NH.  No further SLP intervention indicated at this time.    Assessment:     Nargis Pastrana is a 89 y.o. female with an SLP diagnosis of Dysphagia with high risk of suspected aspiration following bedside CSE as detailed above.  Pt nonverbal and unable to follow commands for strategy use.  Pt/family opted for hospice/comfort measures and comfort feeds to maximize quality of life. No further SLP intervention indicated at this time.      Goals:   Multidisciplinary Problems       SLP Goals          Problem: SLP    Goal Priority Disciplines Outcome   SLP Goal     SLP    Description: 1.  Pt will consume the least restrictive PO diet without incident.  **Further recs/goals pending family and palliative meeting**                       Plan:     Plan of Care reviewed with:  patient, son   SLP Follow-Up:  No (Pt/family opted for d/c to NH with hospice/comfort care.)       Discharge recommendations:  nursing facility, basic (Pt d/c to NH with hsopice per Palliative Medicine)   Barriers to Discharge:  None    Time  Tracking:     SLP Treatment Date:   09/16/22  Speech Start Time:  0930  Speech Stop Time:  1000     Speech Total Time (min):  30 min    Billable Minutes: Eval Swallow and Oral Function 30 minutes    09/16/2022

## 2022-09-16 NOTE — ASSESSMENT & PLAN NOTE
Patient with acute kidney injury likely due to IVVD/dehydration TRAV is currently stable. Labs reviewed- Renal function/electrolytes with Estimated Creatinine Clearance: 16.1 mL/min (A) (based on SCr of 1.6 mg/dL (H)). according to latest data. Monitor urine output and serial BMP and adjust therapy as needed. Avoid nephrotoxins and renally dose meds for GFR listed above.     S/t above  IV fluids, trend Cr    9/16/22  Trends reviewed. Appears to at baseline.

## 2022-09-16 NOTE — HPI
89-year-old female patient admitted to the hospital for worsening change of mental status, respiratory distress uneased appearance at the nursing home.

## 2022-09-16 NOTE — PT/OT/SLP EVAL
Physical Therapy Evaluation and Discharge Note    Patient Name:  Nargis Pastrana   MRN:  [id number]    Recommendations:     Discharge Recommendations:  nursing facility, basic   Discharge Equipment Recommendations: none   Barriers to discharge: None    Assessment:     Nargis Pastrana is a 89 y.o. female admitted with a medical diagnosis of Right lower lobe pneumonia. .  At this time, patient is functioning at their prior level of function and does not require further acute PT services.     Recent Surgery: * No surgery found *      Plan:     During this hospitalization, patient does not require further acute PT services.  Please re-consult if situation changes.      Subjective     Chief Complaint: NONE STATED   Patient/Family Comments/goals: NONE STATED   Pain/Comfort:  Pain Rating 1: 0/10  Pain Rating Post-Intervention 1: 0/10  Pain Rating Post-Intervention 2: 0/10    Patients cultural, spiritual, [employer] conflicts given the current situation:      Living Environment:   PT IS A NH RESIDENT AND REQUIRES TOTAL CARE   Prior to admission, patients level of function was UNKNOWN .  Equipment used at home:  .  DME owned (not currently used): none.  Upon discharge, patient will have assistance from NH STAFF.    Objective:     Communicated with NURSE JIMÉNEZ AND [facility] CHART REVIEW  prior to session.  Patient found HOB elevated with peripheral IV, telemetry, PureWick, oxygen upon PT entry to room.    General Precautions: Standard, fall, hearing impaired   Orthopedic Precautions:N/A   Braces: N/A   Respiratory Status: Nasal cannula, flow 2 L/min    Exams:  Cognitive Exam:  Patient is oriented to Person  RLE ROM: LIMITED   RLE Strength: UNABLE TO ASSESS  LLE ROM: LIMITED  LLE Strength: UNABLE TO ASSESS    Functional Mobility:  Bed Mobility:     Rolling Left:  dependence  Rolling Right: dependence  Scooting: dependence    AM-PAC 6 CLICK MOBILITY  Total Score:6       Therapeutic Activities and Exercises:   PT WITH ALONSO MUNOZ  CONTRACTURE AND DEC ROM AT HIP AND KNEES WITH RESISTANCE AT TIMES. PT SCOOTED TO HOB WITH TOTAL A AND HOB ELEVATED TO 30 DEG AND PT POSITIONED IN R SIDE LYING WITH HOB > 30 DEG    AM-PAC 6 CLICK MOBILITY  Total Score:6     Patient left HOB elevated with call button in reach and bed alarm on.    GOALS:   Multidisciplinary Problems       Physical Therapy Goals       Not on file                    History:     Past Medical History:   Diagnosis Date    Depression     Glaucoma (increased eye pressure)     Hyperlipemia     Renal disorder        Past Surgical History:   Procedure Laterality Date    APPENDECTOMY      EYE SURGERY      HYSTERECTOMY         Time Tracking:     PT Received On: 09/16/22  PT Start Time: 0850     PT Stop Time: 0915  PT Total Time (min): 25 min     Billable Minutes: Evaluation 16 and Therapeutic Activity 9      09/16/2022

## 2022-09-16 NOTE — CONSULTS
[facility] - Med Surg 3  Pulmonology  Consult Note    Patient Name: Nargis Pastrana  MRN: [id number]  Admission Date: 9/15/2022  Hospital Length of Stay: 0 days  Code Status: DNR  Attending Physician: Jaime Cruz MD  Primary Care Provider: Paul Modi MD   Principal Problem: Right lower lobe pneumonia    [unfilled]  Subjective:     HPI:  89-year-old female patient admitted to the hospital for worsening change of mental status, respiratory distress uneased appearance at the nursing home.      Past Medical History:   Diagnosis Date    Depression     Glaucoma (increased eye pressure)     Hyperlipemia     Renal disorder        Past Surgical History:   Procedure Laterality Date    APPENDECTOMY      EYE SURGERY      HYSTERECTOMY         Review of patient's allergies indicates:  No Known Allergies    Family History       Family history is unknown by patient.          Tobacco Use    Smoking status: Never    Smokeless tobacco: Never   Substance and Sexual Activity    Alcohol use: No    Drug use: No    Sexual activity: Not on file         Review of Systems   Constitutional:  Positive for activity change, appetite change and fatigue. Negative for chills, fever and unexpected weight change.   HENT:  Negative for drooling, ear discharge and nosebleeds.    Eyes:  Negative for pain, discharge and itching.   Respiratory:  Positive for cough and shortness of breath. Negative for choking.    Cardiovascular:  Negative for chest pain.   Gastrointestinal:  Negative for anal bleeding.   Endocrine: Negative for cold intolerance.   Genitourinary:  Negative for hematuria.   Musculoskeletal:  Positive for arthralgias, back pain and gait problem. Negative for neck stiffness.   Skin:  Negative for rash.   Allergic/Immunologic: Negative for immunocompromised state.   Neurological:  Positive for weakness. Negative for seizures and facial asymmetry.   Hematological:  Negative for adenopathy.   Psychiatric/Behavioral:  Negative for  behavioral problems, self-injury and suicidal ideas.         Nonverbal    Objective:     Vital Signs (Most Recent):  Temp: 97.4 °F (36.3 °C) (09/16/22 1204)  Pulse: 73 (09/16/22 1204)  Resp: 20 (09/16/22 1204)  BP: (!) 118/59 (09/16/22 1204)  SpO2: 98 % (09/16/22 1204)   Vital Signs (24h Range):  Temp:  [97.4 °F (36.3 °C)-98.9 °F (37.2 °C)] 97.4 °F (36.3 °C)  Pulse:  [53-93] 73  Resp:  [16-20] 20  SpO2:  [92 %-99 %] 98 %  BP: (112-190)/(59-95) 118/59     Weight: 55.8 kg (123 lb 0.3 oz)  Body mass index is 19.86 kg/m².      Intake/Output Summary (Last 24 hours) at 9/16/2022 1223  Last data filed at 9/16/2022 1037  Gross per 24 hour   Intake 1247.47 ml   Output 200 ml   Net 1047.47 ml       Physical Exam  Vitals and nursing note reviewed.   Constitutional:       General: She is not in acute distress.     Appearance: She is well-developed. She is ill-appearing and toxic-appearing.   HENT:      Head: Normocephalic and atraumatic.      Nose: Nose normal.   Eyes:      Extraocular Movements: Extraocular movements intact.   Cardiovascular:      Rate and Rhythm: Normal rate and regular rhythm.   Pulmonary:      Effort: Pulmonary effort is normal. No respiratory distress.      Breath sounds: No stridor.   Abdominal:      General: There is no distension.   Genitourinary:     Comments: External urine catheter  Musculoskeletal:         General: No signs of injury.      Cervical back: Normal range of motion and neck supple.   Skin:     General: Skin is warm and dry.   Neurological:      General: No focal deficit present.      Mental Status: She is alert.      Comments: Nonverbal awake   Psychiatric:         Behavior: Behavior normal.       Vents:       Lines/Drains/Airways       Drain  Duration             Female External Urinary Catheter 09/16/22 0818 <1 day              Peripheral Intravenous Line  Duration                  Peripheral IV - Single Lumen 09/15/22 0541 20 G Left Forearm 1 day                    Significant  Labs:    CBC/Anemia Profile:  Recent Labs   Lab 09/15/22  0640 09/16/22  0522   WBC 12.97* 12.13   HGB 11.0* 9.9*   HCT 34.6* 32.8*    206   MCV 93 94   RDW 14.0 14.1        Chemistries:  Recent Labs   Lab 09/15/22  0731 09/16/22  0522    141   K 4.2 5.4*   * 111*   CO2 18* 23   BUN 34* 34*   CREATININE 1.5* 1.6*   CALCIUM 9.1 8.9   ALBUMIN 3.0* 2.7*   PROT 7.4 6.3   BILITOT 0.4 0.5   ALKPHOS 106 102   ALT 17 14   AST 21 19       MRI brain 09/15/2022 no acute finding          Significant Imaging:     CT chest abdomen pelvis 09/15/2022    EXAMINATION:  CT CHEST ABDOMEN PELVIS WITHOUT CONTRAST(XPD)     CLINICAL HISTORY:  Sepsis;complaining of chest and abdomen pain. Dementia. can't provide accurate history;     TECHNIQUE:  Low dose axial images, sagittal and coronal reformations were obtained from the thoracic inlet to the pubic synthesis .  Oral contrast was not administered. All CT scans at this facility are performed using dose optimization techniques including the following: automated exposure control; adjustment of the mA and/or kV; use of iterative reconstruction technique.     COMPARISON:  Chest radiograph 09/15/2022     FINDINGS:  Beam hardening artifact, patient motion, noncontrast technique somewhat limits evaluation.     Thoracic soft tissues: No significant abnormality.     Aorta: Mildly tortuous, however normal in caliber.  There is severe atherosclerotic calcification.  Significant calcification additionally present at the origin of the left subclavian artery.     Heart: Normal in size small pericardial effusion.  There is severe coronary artery atherosclerosis.  There is significant calcification of the aortic valve.  Additional calcification present at the mitral valve annulus.     Jasmyn/Mediastinum: No significant lymphadenopathy     Lungs: There is a small right pneumothorax.  There is near complete collapse of the right lower lobe with superimposed consolidation.  Opacity present  within the right lower lobe bronchial tree.  There is mild ground-glass density within the right middle lobe, which may be due to atelectatic change versus additional infiltrate.  Within the posterior right upper lobe lies a partially calcified 1.1 x 1.3 cm nodule.  The left lung demonstrates dependent density within the posterior aspect of the left lower lobe which may be due to atelectatic change or additional site of infiltrate.  There is a noncalcified 1.0 cm nodule within the lingula (axial series 6, image 208).  There is trace bilateral pleural fluid, right more than left.  No left pneumothorax.  Bronchial tree on the left appears patent..     Liver: Appears normal in size and attenuation without concerning focal hepatic abnormality within the limits of noncontrast technique and beam hardening artifact.  Calcified granulomas noted.     Gallbladder: No calcified gallstones.     Bile Ducts: No evidence of dilated ducts.     Pancreas: No mass or peripancreatic fat stranding.     Spleen: Unremarkable.     Adrenals: Unremarkable.     Kidneys/ Ureters: Kidneys are small in size with a lobular contour, normal invade location.  No nephrolithiasis or hydronephrosis.  Ureters are normal in course and caliber.  Normal concentration and excretion of contrast. No hydronephrosis or nephrolithiasis. No ureteral dilatation.     Bladder: No evidence of wall thickening.     Reproductive organs: Unremarkable.     GI Tract/Mesentery: Small hiatal hernia.  No evidence of bowel obstruction or inflammation. Significant volume stool within the rectum.  No inflammatory change in the expected region of the appendix to indicate appendicitis.  There are few colonic diverticula without inflammatory change of diverticulitis.  Portions of the sigmoid colon are decompressed with mild mural prominence, suspected related to decompression, though underlying lesion cannot be excluded at these sites, particularly at the rectosigmoid junction  (axial series 3, image 140).  Correlate with recent colonoscopy.     Peritoneal Space: No ascites. No free air.     Retroperitoneum: No significant adenopathy.     Abdominal wall: Small fat containing umbilical and right inguinal hernias.     Vasculature: There is severe aortic atherosclerosis.  No aneurysm.     Bones: No acute fracture. There is osteopenia and degenerative change of the spine noting grade 1 retrolisthesis of L2 on L3.  There is mild deformity of the mid sternal body with slightly increased sclerosis at this site.  Correlate for prior injury.     Impression:     Near complete collapse of the right lower lobe with superimposed consolidation, concerning for aspiration or pneumonia.  Opacity present within the right lower lobe bronchial tree, concerning for aspiration, mucous plugging, or endobronchial lesion.     Small right pneumothorax.     Mild deformity of the mid sternal body with slightly increased sclerosis at this site.  Correlate for prior injury.  No definite acute fracture seen.  Underlying osseous lesion cannot be excluded.     Bilateral pulmonary nodules with the largest measuring 1.3 cm on the right, partially calcified.  A noncalcified 1.0 cm nodule present within the lingula.  For a solid nodule >8 mm, Fleischner Society 2017 guidelines recommend considering CT, PET/CT or tissue sampling at 3 months.          ABG  No results for input(s): PH, PO2, PCO2, HCO3, BE in the last 168 hours.  Assessment/Plan:     * Right lower lobe pneumonia  Likely aspiration related.  Swallow evaluation.  Chest PT. NPO.  Palliative care consult.  IV Rocephin azithromycin.  DC Flagyl.  Rocephin carries good coverage for anaerobes.    Lung nodule  Infectious versus metastatic.  Treat pneumonia for now.  Conservative management.    TRAV (acute kidney injury)  LR.  Avoid nephrotoxic drugs    Primary spontaneous pneumothorax  Possibly pneumothorax ex vacuo chest x-ray as needed.  Target O2 sat 92-94%.    Acute  metabolic encephalopathy  Treat right lung pneumonia.        Patient 89-year-old.  Will proceed with bronchoscopy and will avoid invasive procedure , treat conservatively palliative care consult recommended  Thank you for your consult. I will follow-up with patient. Please contact us if you have any additional questions.     Hernando Young MD  Pulmonology  [facility] S[facility]

## 2022-09-16 NOTE — HOSPITAL COURSE
O2 sat 98% on 2 liter/minute.  Afebrile.  Urine output 200 mL.  Chest x-ray and CT chest abdomen pelvis MRI brain reviewed.  I was consulted for evaluation of right lung lower lobe collapse  9/17 O2 sat 98% on 2 liter/minute.  No distress.  T-max 97°.  Cultures unremarkable.  No major events overnight.  9/18 O2 sat 98% on 2 liters/minute.  Afebrile.  On oral diet.  Family feeding patient Chest congestion.  9/19 patient seen and examined not in distress.  On oxygen via nasal cannula.  Son at bedside.

## 2022-09-16 NOTE — ASSESSMENT & PLAN NOTE
S/t aspiration  Empiric Abx  ST to eval and treat    9/16/22  CT showed near complete collapse related to pneumonia and opacity in the right lower bronchial tree that could be related to aspiration or mucous plugging. Pulmonology has been consulted for further assistance.  --IV Azithromycin/Rocephin. Add Flagyl  --awaiting SLP evaluation

## 2022-09-16 NOTE — ASSESSMENT & PLAN NOTE
Small, apical  CTS consulted, f/u recs  Repeat CXR    9/16/22  CXR today reviewed without worsening. Surgical intervention not warranted. Recommends 2 week follow up

## 2022-09-16 NOTE — ASSESSMENT & PLAN NOTE
Likely aspiration related.  Swallow evaluation.  Chest PT. NPO.  Palliative care consult.  IV Rocephin azithromycin.  DC Flagyl.  Rocephin carries good coverage for anaerobes.

## 2022-09-17 LAB
ALBUMIN SERPL BCP-MCNC: 2.4 G/DL (ref 3.5–5.2)
ALP SERPL-CCNC: 89 U/L (ref 55–135)
ALT SERPL W/O P-5'-P-CCNC: 14 U/L (ref 10–44)
ANION GAP SERPL CALC-SCNC: 10 MMOL/L (ref 8–16)
AST SERPL-CCNC: 24 U/L (ref 10–40)
BACTERIA UR CULT: NO GROWTH
BASOPHILS # BLD AUTO: 0.04 K/UL (ref 0–0.2)
BASOPHILS NFR BLD: 0.6 % (ref 0–1.9)
BILIRUB SERPL-MCNC: 0.3 MG/DL (ref 0.1–1)
BUN SERPL-MCNC: 31 MG/DL (ref 10–30)
CALCIUM SERPL-MCNC: 8.4 MG/DL (ref 8.7–10.5)
CHLORIDE SERPL-SCNC: 113 MMOL/L (ref 95–110)
CO2 SERPL-SCNC: 16 MMOL/L (ref 23–29)
CREAT SERPL-MCNC: 1.4 MG/DL (ref 0.5–1.4)
DIFFERENTIAL METHOD: ABNORMAL
EOSINOPHIL # BLD AUTO: 0.3 K/UL (ref 0–0.5)
EOSINOPHIL NFR BLD: 4.2 % (ref 0–8)
ERYTHROCYTE [DISTWIDTH] IN BLOOD BY AUTOMATED COUNT: 14.2 % (ref 11.5–14.5)
EST. GFR  (NO RACE VARIABLE): 33 ML/MIN/1.73 M^2
GLUCOSE SERPL-MCNC: 81 MG/DL (ref 70–110)
HCT VFR BLD AUTO: 31.4 % (ref 37–48.5)
HGB BLD-MCNC: 9.5 G/DL (ref 12–16)
IMM GRANULOCYTES # BLD AUTO: 0.03 K/UL (ref 0–0.04)
IMM GRANULOCYTES NFR BLD AUTO: 0.4 % (ref 0–0.5)
LYMPHOCYTES # BLD AUTO: 2.3 K/UL (ref 1–4.8)
LYMPHOCYTES NFR BLD: 33.9 % (ref 18–48)
MCH RBC QN AUTO: 29.6 PG (ref 27–31)
MCHC RBC AUTO-ENTMCNC: 30.3 G/DL (ref 32–36)
MCV RBC AUTO: 98 FL (ref 82–98)
MONOCYTES # BLD AUTO: 0.6 K/UL (ref 0.3–1)
MONOCYTES NFR BLD: 8.8 % (ref 4–15)
NEUTROPHILS # BLD AUTO: 3.5 K/UL (ref 1.8–7.7)
NEUTROPHILS NFR BLD: 52.1 % (ref 38–73)
NRBC BLD-RTO: 0 /100 WBC
PLATELET # BLD AUTO: 175 K/UL (ref 150–450)
PLATELET BLD QL SMEAR: ABNORMAL
PMV BLD AUTO: 11.2 FL (ref 9.2–12.9)
POCT GLUCOSE: 91 MG/DL (ref 70–110)
POTASSIUM SERPL-SCNC: 5.1 MMOL/L (ref 3.5–5.1)
PROT SERPL-MCNC: 5.7 G/DL (ref 6–8.4)
RBC # BLD AUTO: 3.21 M/UL (ref 4–5.4)
SODIUM SERPL-SCNC: 139 MMOL/L (ref 136–145)
WBC # BLD AUTO: 6.73 K/UL (ref 3.9–12.7)

## 2022-09-17 PROCEDURE — 63600175 PHARM REV CODE 636 W HCPCS: Performed by: NURSE PRACTITIONER

## 2022-09-17 PROCEDURE — 94761 N-INVAS EAR/PLS OXIMETRY MLT: CPT

## 2022-09-17 PROCEDURE — 63600175 PHARM REV CODE 636 W HCPCS: Performed by: STUDENT IN AN ORGANIZED HEALTH CARE EDUCATION/TRAINING PROGRAM

## 2022-09-17 PROCEDURE — 85025 COMPLETE CBC W/AUTO DIFF WBC: CPT | Performed by: STUDENT IN AN ORGANIZED HEALTH CARE EDUCATION/TRAINING PROGRAM

## 2022-09-17 PROCEDURE — 99232 PR SUBSEQUENT HOSPITAL CARE,LEVL II: ICD-10-PCS | Mod: ,,, | Performed by: INTERNAL MEDICINE

## 2022-09-17 PROCEDURE — 63600175 PHARM REV CODE 636 W HCPCS: Performed by: INTERNAL MEDICINE

## 2022-09-17 PROCEDURE — 99900035 HC TECH TIME PER 15 MIN (STAT)

## 2022-09-17 PROCEDURE — 36415 COLL VENOUS BLD VENIPUNCTURE: CPT | Performed by: STUDENT IN AN ORGANIZED HEALTH CARE EDUCATION/TRAINING PROGRAM

## 2022-09-17 PROCEDURE — 25000003 PHARM REV CODE 250: Performed by: NURSE PRACTITIONER

## 2022-09-17 PROCEDURE — 99232 SBSQ HOSP IP/OBS MODERATE 35: CPT | Mod: ,,, | Performed by: INTERNAL MEDICINE

## 2022-09-17 PROCEDURE — 80053 COMPREHEN METABOLIC PANEL: CPT | Performed by: STUDENT IN AN ORGANIZED HEALTH CARE EDUCATION/TRAINING PROGRAM

## 2022-09-17 PROCEDURE — 25000003 PHARM REV CODE 250: Performed by: STUDENT IN AN ORGANIZED HEALTH CARE EDUCATION/TRAINING PROGRAM

## 2022-09-17 PROCEDURE — 27000221 HC OXYGEN, UP TO 24 HOURS

## 2022-09-17 PROCEDURE — 11000001 HC ACUTE MED/SURG PRIVATE ROOM

## 2022-09-17 RX ORDER — SODIUM CHLORIDE 9 MG/ML
INJECTION, SOLUTION INTRAVENOUS
Status: DISCONTINUED | OUTPATIENT
Start: 2022-09-17 | End: 2022-09-19 | Stop reason: HOSPADM

## 2022-09-17 RX ORDER — MORPHINE SULFATE 2 MG/ML
2 INJECTION, SOLUTION INTRAMUSCULAR; INTRAVENOUS EVERY 4 HOURS PRN
Status: DISCONTINUED | OUTPATIENT
Start: 2022-09-17 | End: 2022-09-19 | Stop reason: HOSPADM

## 2022-09-17 RX ADMIN — SODIUM CHLORIDE, SODIUM LACTATE, POTASSIUM CHLORIDE, AND CALCIUM CHLORIDE: .6; .31; .03; .02 INJECTION, SOLUTION INTRAVENOUS at 11:09

## 2022-09-17 RX ADMIN — CEFTRIAXONE 1 G: 1 INJECTION, SOLUTION INTRAVENOUS at 08:09

## 2022-09-17 RX ADMIN — MORPHINE SULFATE 2 MG: 2 INJECTION, SOLUTION INTRAMUSCULAR; INTRAVENOUS at 11:09

## 2022-09-17 RX ADMIN — LATANOPROST 1 DROP: 50 SOLUTION OPHTHALMIC at 08:09

## 2022-09-17 RX ADMIN — SODIUM CHLORIDE 30 ML: 0.9 INJECTION, SOLUTION INTRAVENOUS at 08:09

## 2022-09-17 RX ADMIN — AZITHROMYCIN MONOHYDRATE 500 MG: 500 INJECTION, POWDER, LYOPHILIZED, FOR SOLUTION INTRAVENOUS at 09:09

## 2022-09-17 NOTE — ASSESSMENT & PLAN NOTE
Infectious versus metastatic.  Treat pneumonia for now.  Conservative management.  9/17 will not pursue further workup.  Comfort care/hospice on DC

## 2022-09-17 NOTE — SUBJECTIVE & OBJECTIVE
9/17 O2 sat 98% on 2 liter/minute.  No distress.  T-max 97°.  Cultures unremarkable.  No major events overnight.  Review of Systems   Constitutional:  Positive for activity change, appetite change and fatigue. Negative for chills, fever and unexpected weight change.   HENT:  Negative for drooling, ear discharge and nosebleeds.    Eyes:  Negative for pain, discharge and itching.   Respiratory:  Positive for cough and shortness of breath. Negative for choking.    Cardiovascular:  Negative for chest pain.   Gastrointestinal:  Negative for anal bleeding.   Endocrine: Negative for cold intolerance.   Genitourinary:  Negative for hematuria.   Musculoskeletal:  Positive for arthralgias, back pain and gait problem. Negative for neck stiffness.   Skin:  Negative for rash.   Allergic/Immunologic: Negative for immunocompromised state.   Neurological:  Positive for weakness. Negative for seizures and facial asymmetry.   Hematological:  Negative for adenopathy.   Psychiatric/Behavioral:  Negative for behavioral problems, self-injury and suicidal ideas.         Nonverbal    Objective:     Vital Signs (Most Recent):  Temp: 98 °F (36.7 °C) (09/17/22 0720)  Pulse: 66 (09/17/22 0759)  Resp: 16 (09/17/22 0759)  BP: (!) 128/58 (09/17/22 0720)  SpO2: 96 % (09/17/22 0759)   Vital Signs (24h Range):  Temp:  [97.4 °F (36.3 °C)-98.4 °F (36.9 °C)] 98 °F (36.7 °C)  Pulse:  [63-79] 66  Resp:  [16-20] 16  SpO2:  [90 %-99 %] 96 %  BP: (116-156)/(54-62) 128/58     Weight: 63.2 kg (139 lb 5.3 oz)  Body mass index is 22.49 kg/m².      Intake/Output Summary (Last 24 hours) at 9/17/2022 1006  Last data filed at 9/17/2022 0400  Gross per 24 hour   Intake 240 ml   Output 625 ml   Net -385 ml         Physical Exam  Vitals and nursing note reviewed.   Constitutional:       General: She is not in acute distress.     Appearance: She is well-developed. She is ill-appearing and toxic-appearing.   HENT:      Head: Normocephalic and atraumatic.      Nose: Nose  normal.   Eyes:      Extraocular Movements: Extraocular movements intact.   Cardiovascular:      Rate and Rhythm: Normal rate and regular rhythm.   Pulmonary:      Effort: Pulmonary effort is normal. No respiratory distress.      Breath sounds: No stridor.   Abdominal:      General: There is no distension.   Genitourinary:     Comments: External urine catheter  Musculoskeletal:         General: No signs of injury.      Cervical back: Normal range of motion and neck supple.   Skin:     General: Skin is warm and dry.   Neurological:      General: No focal deficit present.      Mental Status: She is alert.      Comments: Sleepy   Psychiatric:         Behavior: Behavior normal.       Vents:  Oxygen Concentration (%): 28 (09/17/22 0759)    Lines/Drains/Airways       Drain  Duration             Female External Urinary Catheter 09/16/22 0818 1 day              Peripheral Intravenous Line  Duration                  Peripheral IV - Single Lumen 09/15/22 0541 20 G Left Forearm 2 days                    Significant Labs:    CBC/Anemia Profile:  Recent Labs   Lab 09/16/22  0522 09/17/22  0531   WBC 12.13 6.73   HGB 9.9* 9.5*   HCT 32.8* 31.4*    175   MCV 94 98   RDW 14.1 14.2          Chemistries:  Recent Labs   Lab 09/16/22  0522 09/17/22  0531    139   K 5.4* 5.1   * 113*   CO2 23 16*   BUN 34* 31*   CREATININE 1.6* 1.4   CALCIUM 8.9 8.4*   ALBUMIN 2.7* 2.4*   PROT 6.3 5.7*   BILITOT 0.5 0.3   ALKPHOS 102 89   ALT 14 14   AST 19 24         MRI brain 09/15/2022 no acute finding          Significant Imaging:     CT chest abdomen pelvis 09/15/2022    EXAMINATION:  CT CHEST ABDOMEN PELVIS WITHOUT CONTRAST(XPD)     CLINICAL HISTORY:  Sepsis;complaining of chest and abdomen pain. Dementia. can't provide accurate history;     TECHNIQUE:  Low dose axial images, sagittal and coronal reformations were obtained from the thoracic inlet to the pubic synthesis .  Oral contrast was not administered. All CT scans at this  facility are performed using dose optimization techniques including the following: automated exposure control; adjustment of the mA and/or kV; use of iterative reconstruction technique.     COMPARISON:  Chest radiograph 09/15/2022     FINDINGS:  Beam hardening artifact, patient motion, noncontrast technique somewhat limits evaluation.     Thoracic soft tissues: No significant abnormality.     Aorta: Mildly tortuous, however normal in caliber.  There is severe atherosclerotic calcification.  Significant calcification additionally present at the origin of the left subclavian artery.     Heart: Normal in size small pericardial effusion.  There is severe coronary artery atherosclerosis.  There is significant calcification of the aortic valve.  Additional calcification present at the mitral valve annulus.     Jasmyn/Mediastinum: No significant lymphadenopathy     Lungs: There is a small right pneumothorax.  There is near complete collapse of the right lower lobe with superimposed consolidation.  Opacity present within the right lower lobe bronchial tree.  There is mild ground-glass density within the right middle lobe, which may be due to atelectatic change versus additional infiltrate.  Within the posterior right upper lobe lies a partially calcified 1.1 x 1.3 cm nodule.  The left lung demonstrates dependent density within the posterior aspect of the left lower lobe which may be due to atelectatic change or additional site of infiltrate.  There is a noncalcified 1.0 cm nodule within the lingula (axial series 6, image 208).  There is trace bilateral pleural fluid, right more than left.  No left pneumothorax.  Bronchial tree on the left appears patent..     Liver: Appears normal in size and attenuation without concerning focal hepatic abnormality within the limits of noncontrast technique and beam hardening artifact.  Calcified granulomas noted.     Gallbladder: No calcified gallstones.     Bile Ducts: No evidence of  dilated ducts.     Pancreas: No mass or peripancreatic fat stranding.     Spleen: Unremarkable.     Adrenals: Unremarkable.     Kidneys/ Ureters: Kidneys are small in size with a lobular contour, normal invade location.  No nephrolithiasis or hydronephrosis.  Ureters are normal in course and caliber.  Normal concentration and excretion of contrast. No hydronephrosis or nephrolithiasis. No ureteral dilatation.     Bladder: No evidence of wall thickening.     Reproductive organs: Unremarkable.     GI Tract/Mesentery: Small hiatal hernia.  No evidence of bowel obstruction or inflammation. Significant volume stool within the rectum.  No inflammatory change in the expected region of the appendix to indicate appendicitis.  There are few colonic diverticula without inflammatory change of diverticulitis.  Portions of the sigmoid colon are decompressed with mild mural prominence, suspected related to decompression, though underlying lesion cannot be excluded at these sites, particularly at the rectosigmoid junction (axial series 3, image 140).  Correlate with recent colonoscopy.     Peritoneal Space: No ascites. No free air.     Retroperitoneum: No significant adenopathy.     Abdominal wall: Small fat containing umbilical and right inguinal hernias.     Vasculature: There is severe aortic atherosclerosis.  No aneurysm.     Bones: No acute fracture. There is osteopenia and degenerative change of the spine noting grade 1 retrolisthesis of L2 on L3.  There is mild deformity of the mid sternal body with slightly increased sclerosis at this site.  Correlate for prior injury.     Impression:     Near complete collapse of the right lower lobe with superimposed consolidation, concerning for aspiration or pneumonia.  Opacity present within the right lower lobe bronchial tree, concerning for aspiration, mucous plugging, or endobronchial lesion.     Small right pneumothorax.     Mild deformity of the mid sternal body with slightly  increased sclerosis at this site.  Correlate for prior injury.  No definite acute fracture seen.  Underlying osseous lesion cannot be excluded.     Bilateral pulmonary nodules with the largest measuring 1.3 cm on the right, partially calcified.  A noncalcified 1.0 cm nodule present within the lingula.  For a solid nodule >8 mm, Fleischner Society 2017 guidelines recommend considering CT, PET/CT or tissue sampling at 3 months.

## 2022-09-17 NOTE — ASSESSMENT & PLAN NOTE
Possibly pneumothorax ex vacuo chest x-ray as needed.  Target O2 sat 92-94%.  9/17 not in distress.  No intervention indicated

## 2022-09-17 NOTE — PLAN OF CARE
Problem: Infection  Goal: Absence of Infection Signs and Symptoms  Intervention: Prevent or Manage Infection  Flowsheets (Taken 9/17/2022 0243)  Fever Reduction/Comfort Measures: lightweight clothing  Infection Management: aseptic technique maintained     Problem: Fall Injury Risk  Goal: Absence of Fall and Fall-Related Injury  Intervention: Identify and Manage Contributors  Flowsheets (Taken 9/17/2022 0243)  Medication Review/Management: medications reviewed  Intervention: Promote Injury-Free Environment  Flowsheets (Taken 9/17/2022 0243)  Safety Promotion/Fall Prevention:   assistive device/personal item within reach   bed alarm set   Fall Risk reviewed with patient/family   nonskid shoes/socks when out of bed   medications reviewed   instructed to call staff for mobility     Problem: Skin Injury Risk Increased  Goal: Skin Health and Integrity  Intervention: Optimize Skin Protection  Flowsheets (Taken 9/17/2022 0243)  Pressure Reduction Techniques:   frequent weight shift encouraged   weight shift assistance provided     Problem: Infection (Pneumonia)  Goal: Resolution of Infection Signs and Symptoms  Intervention: Prevent Infection Progression  Flowsheets (Taken 9/17/2022 0243)  Fever Reduction/Comfort Measures: lightweight clothing  Infection Management: aseptic technique maintained     Problem: Respiratory Compromise (Pneumonia)  Goal: Effective Oxygenation and Ventilation  Intervention: Promote Airway Secretion Clearance  Flowsheets (Taken 9/17/2022 0243)  Breathing Techniques/Airway Clearance: deep/controlled cough encouraged  Cough And Deep Breathing: done independently per patient

## 2022-09-17 NOTE — PLAN OF CARE
POC reviewed with pt family. Family verbalizes understanding of POC. No questions at this time.  Paint nonverbal. Watched for nonverbal cues for pain.   NSR on cardiac monitor.  Pt remains free of falls. Patient turned q4 hours.   No complaints at this time.  Safety measures in place. Will continue to monitor.  Hourly rounding complete.    Script sent.

## 2022-09-17 NOTE — PROGRESS NOTES
[facility] - Med Surg 3  [facility] Medicine  Progress Note    Patient Name: Nargis Pastrana  MRN: [id number]  Patient Class: IP- Inpatient   Admission Date: 9/15/2022  Length of Stay: 1 days  Attending Physician: Jaime Cruz MD  Primary Care Provider: Paul Modi MD        Subjective:     Principal Problem:Right lower lobe pneumonia        HPI:  89 y.o. female patient with a PMHx of renal disorder and hyperlipidemia who presents to the Emergency Department for evaluation of AMS. Pt is a resident at [facility] who was sent to the ED by staff who state pt began suddenly moaning and rubbing epigastric area but not verbalizing a complaint. Staff at [facility] state this is not the pt's normal behavior. CT chest shows RLL consolidation and lung collapse. There is also small apical PTX, CTS consulted.       Overview/Hospital Course:  Nargis Pastrana is a 89-year-old female who was admitted to [facility] for metabolic encephalopathy and severe right lower lobe pneumonia.  CT showed a near collapse of the right lung due to severe consolidation as well as opacity in the right lower lobe which could represent aspiration or mucus plugging. Incidental finding of 1cm right upper lobe nodule. Seen by pulmonology who recommended SLP and palliative care consult due to concern of chronic aspiration. Will continue IV abx. Small uncomplicated pneumothorax. Patient was seen by CT surgery who repeated chest x-ray today which demonstrated a tiny apical pneumothorax.  Surgical intervention was not recommended.  CVA or other neurologic lesion ruled out by MRI.    9/17: NAEO, afebrile. Continue PO intake per norm, planning to d/c 9/19 AM with hospice to [facility]. Abx treatment for aspiration PNA      Interval History: Mental status has improved per sons, but responses are still slowed.     Review of Systems   Unable to perform ROS: Mental status change   Objective:     Vital Signs (Most Recent):  Temp: 98 °F (36.7  °C) (09/17/22 0720)  Pulse: 66 (09/17/22 0759)  Resp: 16 (09/17/22 0759)  BP: (!) 128/58 (09/17/22 0720)  SpO2: 96 % (09/17/22 0759) Vital Signs (24h Range):  Temp:  [97.4 °F (36.3 °C)-98.4 °F (36.9 °C)] 98 °F (36.7 °C)  Pulse:  [63-79] 66  Resp:  [16-20] 16  SpO2:  [90 %-99 %] 96 %  BP: (116-156)/(54-62) 128/58     Weight: 63.2 kg (139 lb 5.3 oz)  Body mass index is 22.49 kg/m².    Intake/Output Summary (Last 24 hours) at 9/17/2022 0931  Last data filed at 9/17/2022 0400  Gross per 24 hour   Intake 240 ml   Output 625 ml   Net -385 ml        Physical Exam  Constitutional:       General: She is not in acute distress.     Appearance: Normal appearance. She is ill-appearing.   HENT:      Head: Normocephalic and atraumatic.      Mouth/Throat:      Mouth: Mucous membranes are dry.   Cardiovascular:      Rate and Rhythm: Normal rate and regular rhythm.   Pulmonary:      Effort: Pulmonary effort is normal.      Breath sounds: Normal breath sounds.      Comments: Coarse breath sounds   Abdominal:      General: Abdomen is flat. There is no distension.      Palpations: Abdomen is soft.      Tenderness: There is no abdominal tenderness. There is no guarding.   Musculoskeletal:         General: No swelling. Normal range of motion.      Right lower leg: No edema.      Left lower leg: No edema.   Skin:     General: Skin is warm and dry.   Neurological:      Mental Status: She is alert. She is disoriented.         Significant Labs: All pertinent labs within the past 24 hours have been reviewed.  CBC:   Recent Labs   Lab 09/16/22 0522 09/17/22 0531   WBC 12.13 6.73   HGB 9.9* 9.5*   HCT 32.8* 31.4*    175       CMP:   Recent Labs   Lab 09/16/22 0522 09/17/22 0531    139   K 5.4* 5.1   * 113*   CO2 23 16*    81   BUN 34* 31*   CREATININE 1.6* 1.4   CALCIUM 8.9 8.4*   PROT 6.3 5.7*   ALBUMIN 2.7* 2.4*   BILITOT 0.5 0.3   ALKPHOS 102 89   AST 19 24   ALT 14 14   ANIONGAP 7* 10         Significant  Imaging:   XR CHEST 1 VIEW     CLINICAL HISTORY:  right sided pneumothorax;Pneumothorax, unspecified     FINDINGS:  Single view of the chest.  Comparison 09/15/2022     Cardiac silhouette is normal.  The lungs demonstrate no evidence of active disease.  Right lower lobe atelectasis.  Tiny right apical pneumothorax.  Trace effusion on the right.  Low lung volumes limits evaluation.  Bones appear intact.  Moderate degenerative change.  Aorta demonstrates atherosclerotic disease.     Impression:     See findings above    MRI BRAIN WITHOUT CONTRAST     CLINICAL HISTORY:  Aphasia.  TIA.  Mental status change, unknown cause;     TECHNIQUE:  Standard multiplanar noncontrast sequences of the brain.     COMPARISON:  CT brain 09/15/2022.     FINDINGS:  The ventricles are moderately enlarged as are the extra-axial CSF spaces.     There is moderately extensive white matter hyperintensity, more pronounced in the frontal lobe distribution.     The gradient echo sequence is negative.  No evidence of chronic hemorrhage.     No extra-axial fluid collection is seen.     Skull base appears normal.     The diffusion sequence is negative.     Impression:     No acute or focal process.  Moderately advanced atrophy and white matter degeneration.    CT CHEST ABDOMEN PELVIS WITHOUT CONTRAST(XPD)     CLINICAL HISTORY:  Sepsis;complaining of chest and abdomen pain. Dementia. can't provide accurate history;     TECHNIQUE:  Low dose axial images, sagittal and coronal reformations were obtained from the thoracic inlet to the pubic synthesis .  Oral contrast was not administered. All CT scans at this facility are performed using dose optimization techniques including the following: automated exposure control; adjustment of the mA and/or kV; use of iterative reconstruction technique.     COMPARISON:  Chest radiograph 09/15/2022     FINDINGS:  Beam hardening artifact, patient motion, noncontrast technique somewhat limits evaluation.     Thoracic  soft tissues: No significant abnormality.     Aorta: Mildly tortuous, however normal in caliber.  There is severe atherosclerotic calcification.  Significant calcification additionally present at the origin of the left subclavian artery.     Heart: Normal in size small pericardial effusion.  There is severe coronary artery atherosclerosis.  There is significant calcification of the aortic valve.  Additional calcification present at the mitral valve annulus.     Jasmyn/Mediastinum: No significant lymphadenopathy     Lungs: There is a small right pneumothorax.  There is near complete collapse of the right lower lobe with superimposed consolidation.  Opacity present within the right lower lobe bronchial tree.  There is mild ground-glass density within the right middle lobe, which may be due to atelectatic change versus additional infiltrate.  Within the posterior right upper lobe lies a partially calcified 1.1 x 1.3 cm nodule.  The left lung demonstrates dependent density within the posterior aspect of the left lower lobe which may be due to atelectatic change or additional site of infiltrate.  There is a noncalcified 1.0 cm nodule within the lingula (axial series 6, image 208).  There is trace bilateral pleural fluid, right more than left.  No left pneumothorax.  Bronchial tree on the left appears patent..     Liver: Appears normal in size and attenuation without concerning focal hepatic abnormality within the limits of noncontrast technique and beam hardening artifact.  Calcified granulomas noted.     Gallbladder: No calcified gallstones.     Bile Ducts: No evidence of dilated ducts.     Pancreas: No mass or peripancreatic fat stranding.     Spleen: Unremarkable.     Adrenals: Unremarkable.     Kidneys/ Ureters: Kidneys are small in size with a lobular contour, normal invade location.  No nephrolithiasis or hydronephrosis.  Ureters are normal in course and caliber.  Normal concentration and excretion of contrast. No  hydronephrosis or nephrolithiasis. No ureteral dilatation.     Bladder: No evidence of wall thickening.     Reproductive organs: Unremarkable.     GI Tract/Mesentery: Small hiatal hernia.  No evidence of bowel obstruction or inflammation. Significant volume stool within the rectum.  No inflammatory change in the expected region of the appendix to indicate appendicitis.  There are few colonic diverticula without inflammatory change of diverticulitis.  Portions of the sigmoid colon are decompressed with mild mural prominence, suspected related to decompression, though underlying lesion cannot be excluded at these sites, particularly at the rectosigmoid junction (axial series 3, image 140).  Correlate with recent colonoscopy.     Peritoneal Space: No ascites. No free air.     Retroperitoneum: No significant adenopathy.     Abdominal wall: Small fat containing umbilical and right inguinal hernias.     Vasculature: There is severe aortic atherosclerosis.  No aneurysm.     Bones: No acute fracture. There is osteopenia and degenerative change of the spine noting grade 1 retrolisthesis of L2 on L3.  There is mild deformity of the mid sternal body with slightly increased sclerosis at this site.  Correlate for prior injury.     Impression:     Near complete collapse of the right lower lobe with superimposed consolidation, concerning for aspiration or pneumonia.  Opacity present within the right lower lobe bronchial tree, concerning for aspiration, mucous plugging, or endobronchial lesion.     Small right pneumothorax.     Mild deformity of the mid sternal body with slightly increased sclerosis at this site.  Correlate for prior injury.  No definite acute fracture seen.  Underlying osseous lesion cannot be excluded.     Bilateral pulmonary nodules with the largest measuring 1.3 cm on the right, partially calcified.  A noncalcified 1.0 cm nodule present within the lingula.  For a solid nodule >8 mm, Fleischner Society 2017  guidelines recommend considering CT, PET/CT or tissue sampling at 3 months.     Additional findings as above.     Findings discussed with Dr. Soto over the telephone at 10:00 on 09/15/2022.      Assessment/Plan:      * Right lower lobe pneumonia  S/t aspiration  Empiric Abx  ST to eval and treat    9/16/22  CT showed near complete collapse related to pneumonia and opacity in the right lower bronchial tree that could be related to aspiration or mucous plugging. Pulmonology has been consulted for further assistance.  --IV Azithromycin/Rocephin. Add Flagyl  --awaiting SLP evaluation    Acute metabolic encephalopathy  Suspect related to acute infection as symptom are gradually improving.   CT head showed possible chronic lesion but MRI negative.         Primary spontaneous pneumothorax  Small, apical  CTS consulted, f/u recs  Repeat CXR    9/16/22  CXR today reviewed without worsening. Surgical intervention not warranted. Recommends 2 week follow up    Lung nodule  1.1x 1.3 cm nodule  Pulmonology consulted.  monitor    Constipation  Enema for BM      TRAV (acute kidney injury)  Patient with acute kidney injury likely due to IVVD/dehydration TRAV is currently stable. Labs reviewed- Renal function/electrolytes with Estimated Creatinine Clearance: 16.1 mL/min (A) (based on SCr of 1.6 mg/dL (H)). according to latest data. Monitor urine output and serial BMP and adjust therapy as needed. Avoid nephrotoxins and renally dose meds for GFR listed above.     S/t above  IV fluids, trend Cr    9/16/22  Trends reviewed. Appears to at baseline.       VTE Risk Mitigation (From admission, onward)         Ordered     IP VTE HIGH RISK PATIENT  Once         09/15/22 1521     Place sequential compression device  Until discontinued         09/15/22 1521                Discharge Planning   TERE:      Code Status: DNR   Is the patient medically ready for discharge?:     Reason for patient still in hospital (select all that apply): Patient  trending condition, Treatment and Pending disposition  Discharge Plan A: Return to nursing home   Discharge Delays: None known at this time              Jaime Cruz MD  Department of Hospital Medicine   [facility] - Med Surg 3

## 2022-09-17 NOTE — PROGRESS NOTES
[facility]  Pulmonology  Progress Note    Patient Name: Nargis Pastrana  MRN: [id number]  Admission Date: 9/15/2022  Hospital Length of Stay: 1 days  Code Status: DNR  Attending Provider: Jaime Cruz MD  Primary Care Provider: Paul Modi MD   Principal Problem: Right lower lobe pneumonia    Subjective:     9/17 O2 sat 98% on 2 liter/minute.  No distress.  T-max 97°.  Cultures unremarkable.  No major events overnight.  Review of Systems   Constitutional:  Positive for activity change, appetite change and fatigue. Negative for chills, fever and unexpected weight change.   HENT:  Negative for drooling, ear discharge and nosebleeds.    Eyes:  Negative for pain, discharge and itching.   Respiratory:  Positive for cough and shortness of breath. Negative for choking.    Cardiovascular:  Negative for chest pain.   Gastrointestinal:  Negative for anal bleeding.   Endocrine: Negative for cold intolerance.   Genitourinary:  Negative for hematuria.   Musculoskeletal:  Positive for arthralgias, back pain and gait problem. Negative for neck stiffness.   Skin:  Negative for rash.   Allergic/Immunologic: Negative for immunocompromised state.   Neurological:  Positive for weakness. Negative for seizures and facial asymmetry.   Hematological:  Negative for adenopathy.   Psychiatric/Behavioral:  Negative for behavioral problems, self-injury and suicidal ideas.         Nonverbal    Objective:     Vital Signs (Most Recent):  Temp: 98 °F (36.7 °C) (09/17/22 0720)  Pulse: 66 (09/17/22 0759)  Resp: 16 (09/17/22 0759)  BP: (!) 128/58 (09/17/22 0720)  SpO2: 96 % (09/17/22 0759)   Vital Signs (24h Range):  Temp:  [97.4 °F (36.3 °C)-98.4 °F (36.9 °C)] 98 °F (36.7 °C)  Pulse:  [63-79] 66  Resp:  [16-20] 16  SpO2:  [90 %-99 %] 96 %  BP: (116-156)/(54-62) 128/58     Weight: 63.2 kg (139 lb 5.3 oz)  Body mass index is 22.49 kg/m².      Intake/Output Summary (Last 24 hours) at 9/17/2022 1006  Last data filed at 9/17/2022 0400  Gross  per 24 hour   Intake 240 ml   Output 625 ml   Net -385 ml         Physical Exam  Vitals and nursing note reviewed.   Constitutional:       General: She is not in acute distress.     Appearance: She is well-developed. She is ill-appearing and toxic-appearing.   HENT:      Head: Normocephalic and atraumatic.      Nose: Nose normal.   Eyes:      Extraocular Movements: Extraocular movements intact.   Cardiovascular:      Rate and Rhythm: Normal rate and regular rhythm.   Pulmonary:      Effort: Pulmonary effort is normal. No respiratory distress.      Breath sounds: No stridor.   Abdominal:      General: There is no distension.   Genitourinary:     Comments: External urine catheter  Musculoskeletal:         General: No signs of injury.      Cervical back: Normal range of motion and neck supple.   Skin:     General: Skin is warm and dry.   Neurological:      General: No focal deficit present.      Mental Status: She is alert.      Comments: Sleepy   Psychiatric:         Behavior: Behavior normal.       Vents:  Oxygen Concentration (%): 28 (09/17/22 0759)    Lines/Drains/Airways       Drain  Duration             Female External Urinary Catheter 09/16/22 0818 1 day              Peripheral Intravenous Line  Duration                  Peripheral IV - Single Lumen 09/15/22 0541 20 G Left Forearm 2 days                    Significant Labs:    CBC/Anemia Profile:  Recent Labs   Lab 09/16/22  0522 09/17/22  0531   WBC 12.13 6.73   HGB 9.9* 9.5*   HCT 32.8* 31.4*    175   MCV 94 98   RDW 14.1 14.2          Chemistries:  Recent Labs   Lab 09/16/22  0522 09/17/22  0531    139   K 5.4* 5.1   * 113*   CO2 23 16*   BUN 34* 31*   CREATININE 1.6* 1.4   CALCIUM 8.9 8.4*   ALBUMIN 2.7* 2.4*   PROT 6.3 5.7*   BILITOT 0.5 0.3   ALKPHOS 102 89   ALT 14 14   AST 19 24         MRI brain 09/15/2022 no acute finding          Significant Imaging:     CT chest abdomen pelvis 09/15/2022    EXAMINATION:  CT CHEST ABDOMEN PELVIS  WITHOUT CONTRAST(XPD)     CLINICAL HISTORY:  Sepsis;complaining of chest and abdomen pain. Dementia. can't provide accurate history;     TECHNIQUE:  Low dose axial images, sagittal and coronal reformations were obtained from the thoracic inlet to the pubic synthesis .  Oral contrast was not administered. All CT scans at this facility are performed using dose optimization techniques including the following: automated exposure control; adjustment of the mA and/or kV; use of iterative reconstruction technique.     COMPARISON:  Chest radiograph 09/15/2022     FINDINGS:  Beam hardening artifact, patient motion, noncontrast technique somewhat limits evaluation.     Thoracic soft tissues: No significant abnormality.     Aorta: Mildly tortuous, however normal in caliber.  There is severe atherosclerotic calcification.  Significant calcification additionally present at the origin of the left subclavian artery.     Heart: Normal in size small pericardial effusion.  There is severe coronary artery atherosclerosis.  There is significant calcification of the aortic valve.  Additional calcification present at the mitral valve annulus.     Jasmyn/Mediastinum: No significant lymphadenopathy     Lungs: There is a small right pneumothorax.  There is near complete collapse of the right lower lobe with superimposed consolidation.  Opacity present within the right lower lobe bronchial tree.  There is mild ground-glass density within the right middle lobe, which may be due to atelectatic change versus additional infiltrate.  Within the posterior right upper lobe lies a partially calcified 1.1 x 1.3 cm nodule.  The left lung demonstrates dependent density within the posterior aspect of the left lower lobe which may be due to atelectatic change or additional site of infiltrate.  There is a noncalcified 1.0 cm nodule within the lingula (axial series 6, image 208).  There is trace bilateral pleural fluid, right more than left.  No left  pneumothorax.  Bronchial tree on the left appears patent..     Liver: Appears normal in size and attenuation without concerning focal hepatic abnormality within the limits of noncontrast technique and beam hardening artifact.  Calcified granulomas noted.     Gallbladder: No calcified gallstones.     Bile Ducts: No evidence of dilated ducts.     Pancreas: No mass or peripancreatic fat stranding.     Spleen: Unremarkable.     Adrenals: Unremarkable.     Kidneys/ Ureters: Kidneys are small in size with a lobular contour, normal invade location.  No nephrolithiasis or hydronephrosis.  Ureters are normal in course and caliber.  Normal concentration and excretion of contrast. No hydronephrosis or nephrolithiasis. No ureteral dilatation.     Bladder: No evidence of wall thickening.     Reproductive organs: Unremarkable.     GI Tract/Mesentery: Small hiatal hernia.  No evidence of bowel obstruction or inflammation. Significant volume stool within the rectum.  No inflammatory change in the expected region of the appendix to indicate appendicitis.  There are few colonic diverticula without inflammatory change of diverticulitis.  Portions of the sigmoid colon are decompressed with mild mural prominence, suspected related to decompression, though underlying lesion cannot be excluded at these sites, particularly at the rectosigmoid junction (axial series 3, image 140).  Correlate with recent colonoscopy.     Peritoneal Space: No ascites. No free air.     Retroperitoneum: No significant adenopathy.     Abdominal wall: Small fat containing umbilical and right inguinal hernias.     Vasculature: There is severe aortic atherosclerosis.  No aneurysm.     Bones: No acute fracture. There is osteopenia and degenerative change of the spine noting grade 1 retrolisthesis of L2 on L3.  There is mild deformity of the mid sternal body with slightly increased sclerosis at this site.  Correlate for prior injury.     Impression:     Near complete  collapse of the right lower lobe with superimposed consolidation, concerning for aspiration or pneumonia.  Opacity present within the right lower lobe bronchial tree, concerning for aspiration, mucous plugging, or endobronchial lesion.     Small right pneumothorax.     Mild deformity of the mid sternal body with slightly increased sclerosis at this site.  Correlate for prior injury.  No definite acute fracture seen.  Underlying osseous lesion cannot be excluded.     Bilateral pulmonary nodules with the largest measuring 1.3 cm on the right, partially calcified.  A noncalcified 1.0 cm nodule present within the lingula.  For a solid nodule >8 mm, Fleischner Society 2017 guidelines recommend considering CT, PET/CT or tissue sampling at 3 months.          ABG  No results for input(s): PH, PO2, PCO2, HCO3, BE in the last 168 hours.  Assessment/Plan:     * Right lower lobe pneumonia  Likely aspiration related.  Swallow evaluation.  Chest PT. NPO.  Palliative care consult.  IV Rocephin azithromycin.  DC Flagyl.  Rocephin carries good coverage for anaerobes.  9/17 improved from swallow evaluation noted.  On Rocephin azithromycin.  Likely the plan is to switch to comfort care/hospice on DC.    Lung nodule  Infectious versus metastatic.  Treat pneumonia for now.  Conservative management.  9/17 will not pursue further workup.  Comfort care/hospice on DC    TRAV (acute kidney injury)  LR.  Avoid nephrotoxic drugs  9/16 LR IV fluid.  Creatinine 1.4    Primary spontaneous pneumothorax  Possibly pneumothorax ex vacuo chest x-ray as needed.  Target O2 sat 92-94%.  9/17 not in distress.  No intervention indicated           Hernando Young MD  Pulmonology  [facility]

## 2022-09-17 NOTE — ASSESSMENT & PLAN NOTE
Likely aspiration related.  Swallow evaluation.  Chest PT. NPO.  Palliative care consult.  IV Rocephin azithromycin.  DC Flagyl.  Rocephin carries good coverage for anaerobes.  9/17 improved from swallow evaluation noted.  On Rocephin azithromycin.  Likely the plan is to switch to comfort care/hospice on DC.

## 2022-09-17 NOTE — SUBJECTIVE & OBJECTIVE
Interval History: Mental status has improved per sons, but responses are still slowed.     Review of Systems   Unable to perform ROS: Mental status change   Objective:     Vital Signs (Most Recent):  Temp: 98 °F (36.7 °C) (09/17/22 0720)  Pulse: 66 (09/17/22 0759)  Resp: 16 (09/17/22 0759)  BP: (!) 128/58 (09/17/22 0720)  SpO2: 96 % (09/17/22 0759) Vital Signs (24h Range):  Temp:  [97.4 °F (36.3 °C)-98.4 °F (36.9 °C)] 98 °F (36.7 °C)  Pulse:  [63-79] 66  Resp:  [16-20] 16  SpO2:  [90 %-99 %] 96 %  BP: (116-156)/(54-62) 128/58     Weight: 63.2 kg (139 lb 5.3 oz)  Body mass index is 22.49 kg/m².    Intake/Output Summary (Last 24 hours) at 9/17/2022 0931  Last data filed at 9/17/2022 0400  Gross per 24 hour   Intake 240 ml   Output 625 ml   Net -385 ml        Physical Exam  Constitutional:       General: She is not in acute distress.     Appearance: Normal appearance. She is ill-appearing.   HENT:      Head: Normocephalic and atraumatic.      Mouth/Throat:      Mouth: Mucous membranes are dry.   Cardiovascular:      Rate and Rhythm: Normal rate and regular rhythm.   Pulmonary:      Effort: Pulmonary effort is normal.      Breath sounds: Normal breath sounds.      Comments: Coarse breath sounds   Abdominal:      General: Abdomen is flat. There is no distension.      Palpations: Abdomen is soft.      Tenderness: There is no abdominal tenderness. There is no guarding.   Musculoskeletal:         General: No swelling. Normal range of motion.      Right lower leg: No edema.      Left lower leg: No edema.   Skin:     General: Skin is warm and dry.   Neurological:      Mental Status: She is alert. She is disoriented.         Significant Labs: All pertinent labs within the past 24 hours have been reviewed.  CBC:   Recent Labs   Lab 09/16/22  0522 09/17/22  0531   WBC 12.13 6.73   HGB 9.9* 9.5*   HCT 32.8* 31.4*    175       CMP:   Recent Labs   Lab 09/16/22  0522 09/17/22  0531    139   K 5.4* 5.1   * 113*    CO2 23 16*    81   BUN 34* 31*   CREATININE 1.6* 1.4   CALCIUM 8.9 8.4*   PROT 6.3 5.7*   ALBUMIN 2.7* 2.4*   BILITOT 0.5 0.3   ALKPHOS 102 89   AST 19 24   ALT 14 14   ANIONGAP 7* 10         Significant Imaging:   XR CHEST 1 VIEW     CLINICAL HISTORY:  right sided pneumothorax;Pneumothorax, unspecified     FINDINGS:  Single view of the chest.  Comparison 09/15/2022     Cardiac silhouette is normal.  The lungs demonstrate no evidence of active disease.  Right lower lobe atelectasis.  Tiny right apical pneumothorax.  Trace effusion on the right.  Low lung volumes limits evaluation.  Bones appear intact.  Moderate degenerative change.  Aorta demonstrates atherosclerotic disease.     Impression:     See findings above    MRI BRAIN WITHOUT CONTRAST     CLINICAL HISTORY:  Aphasia.  TIA.  Mental status change, unknown cause;     TECHNIQUE:  Standard multiplanar noncontrast sequences of the brain.     COMPARISON:  CT brain 09/15/2022.     FINDINGS:  The ventricles are moderately enlarged as are the extra-axial CSF spaces.     There is moderately extensive white matter hyperintensity, more pronounced in the frontal lobe distribution.     The gradient echo sequence is negative.  No evidence of chronic hemorrhage.     No extra-axial fluid collection is seen.     Skull base appears normal.     The diffusion sequence is negative.     Impression:     No acute or focal process.  Moderately advanced atrophy and white matter degeneration.    CT CHEST ABDOMEN PELVIS WITHOUT CONTRAST(XPD)     CLINICAL HISTORY:  Sepsis;complaining of chest and abdomen pain. Dementia. can't provide accurate history;     TECHNIQUE:  Low dose axial images, sagittal and coronal reformations were obtained from the thoracic inlet to the pubic synthesis .  Oral contrast was not administered. All CT scans at this facility are performed using dose optimization techniques including the following: automated exposure control; adjustment of the mA and/or  kV; use of iterative reconstruction technique.     COMPARISON:  Chest radiograph 09/15/2022     FINDINGS:  Beam hardening artifact, patient motion, noncontrast technique somewhat limits evaluation.     Thoracic soft tissues: No significant abnormality.     Aorta: Mildly tortuous, however normal in caliber.  There is severe atherosclerotic calcification.  Significant calcification additionally present at the origin of the left subclavian artery.     Heart: Normal in size small pericardial effusion.  There is severe coronary artery atherosclerosis.  There is significant calcification of the aortic valve.  Additional calcification present at the mitral valve annulus.     Jasmyn/Mediastinum: No significant lymphadenopathy     Lungs: There is a small right pneumothorax.  There is near complete collapse of the right lower lobe with superimposed consolidation.  Opacity present within the right lower lobe bronchial tree.  There is mild ground-glass density within the right middle lobe, which may be due to atelectatic change versus additional infiltrate.  Within the posterior right upper lobe lies a partially calcified 1.1 x 1.3 cm nodule.  The left lung demonstrates dependent density within the posterior aspect of the left lower lobe which may be due to atelectatic change or additional site of infiltrate.  There is a noncalcified 1.0 cm nodule within the lingula (axial series 6, image 208).  There is trace bilateral pleural fluid, right more than left.  No left pneumothorax.  Bronchial tree on the left appears patent..     Liver: Appears normal in size and attenuation without concerning focal hepatic abnormality within the limits of noncontrast technique and beam hardening artifact.  Calcified granulomas noted.     Gallbladder: No calcified gallstones.     Bile Ducts: No evidence of dilated ducts.     Pancreas: No mass or peripancreatic fat stranding.     Spleen: Unremarkable.     Adrenals: Unremarkable.     Kidneys/ Ureters:  Kidneys are small in size with a lobular contour, normal invade location.  No nephrolithiasis or hydronephrosis.  Ureters are normal in course and caliber.  Normal concentration and excretion of contrast. No hydronephrosis or nephrolithiasis. No ureteral dilatation.     Bladder: No evidence of wall thickening.     Reproductive organs: Unremarkable.     GI Tract/Mesentery: Small hiatal hernia.  No evidence of bowel obstruction or inflammation. Significant volume stool within the rectum.  No inflammatory change in the expected region of the appendix to indicate appendicitis.  There are few colonic diverticula without inflammatory change of diverticulitis.  Portions of the sigmoid colon are decompressed with mild mural prominence, suspected related to decompression, though underlying lesion cannot be excluded at these sites, particularly at the rectosigmoid junction (axial series 3, image 140).  Correlate with recent colonoscopy.     Peritoneal Space: No ascites. No free air.     Retroperitoneum: No significant adenopathy.     Abdominal wall: Small fat containing umbilical and right inguinal hernias.     Vasculature: There is severe aortic atherosclerosis.  No aneurysm.     Bones: No acute fracture. There is osteopenia and degenerative change of the spine noting grade 1 retrolisthesis of L2 on L3.  There is mild deformity of the mid sternal body with slightly increased sclerosis at this site.  Correlate for prior injury.     Impression:     Near complete collapse of the right lower lobe with superimposed consolidation, concerning for aspiration or pneumonia.  Opacity present within the right lower lobe bronchial tree, concerning for aspiration, mucous plugging, or endobronchial lesion.     Small right pneumothorax.     Mild deformity of the mid sternal body with slightly increased sclerosis at this site.  Correlate for prior injury.  No definite acute fracture seen.  Underlying osseous lesion cannot be excluded.      Bilateral pulmonary nodules with the largest measuring 1.3 cm on the right, partially calcified.  A noncalcified 1.0 cm nodule present within the lingula.  For a solid nodule >8 mm, Fleischner Society 2017 guidelines recommend considering CT, PET/CT or tissue sampling at 3 months.     Additional findings as above.     Findings discussed with Dr. Soto over the telephone at 10:00 on 09/15/2022.

## 2022-09-18 PROCEDURE — 25000003 PHARM REV CODE 250: Performed by: STUDENT IN AN ORGANIZED HEALTH CARE EDUCATION/TRAINING PROGRAM

## 2022-09-18 PROCEDURE — 63600175 PHARM REV CODE 636 W HCPCS: Performed by: STUDENT IN AN ORGANIZED HEALTH CARE EDUCATION/TRAINING PROGRAM

## 2022-09-18 PROCEDURE — 99232 PR SUBSEQUENT HOSPITAL CARE,LEVL II: ICD-10-PCS | Mod: ,,, | Performed by: INTERNAL MEDICINE

## 2022-09-18 PROCEDURE — 99232 SBSQ HOSP IP/OBS MODERATE 35: CPT | Mod: ,,, | Performed by: INTERNAL MEDICINE

## 2022-09-18 PROCEDURE — 63600175 PHARM REV CODE 636 W HCPCS: Performed by: INTERNAL MEDICINE

## 2022-09-18 PROCEDURE — 63600175 PHARM REV CODE 636 W HCPCS: Performed by: NURSE PRACTITIONER

## 2022-09-18 PROCEDURE — 99900035 HC TECH TIME PER 15 MIN (STAT)

## 2022-09-18 PROCEDURE — 27000221 HC OXYGEN, UP TO 24 HOURS

## 2022-09-18 PROCEDURE — 11000001 HC ACUTE MED/SURG PRIVATE ROOM

## 2022-09-18 PROCEDURE — 94761 N-INVAS EAR/PLS OXIMETRY MLT: CPT

## 2022-09-18 RX ORDER — IPRATROPIUM BROMIDE AND ALBUTEROL SULFATE 2.5; .5 MG/3ML; MG/3ML
3 SOLUTION RESPIRATORY (INHALATION) EVERY 8 HOURS
Status: DISCONTINUED | OUTPATIENT
Start: 2022-09-18 | End: 2022-09-19 | Stop reason: HOSPADM

## 2022-09-18 RX ORDER — GUAIFENESIN 600 MG/1
600 TABLET, EXTENDED RELEASE ORAL 2 TIMES DAILY
Status: DISCONTINUED | OUTPATIENT
Start: 2022-09-18 | End: 2022-09-19 | Stop reason: HOSPADM

## 2022-09-18 RX ADMIN — SODIUM CHLORIDE, SODIUM LACTATE, POTASSIUM CHLORIDE, AND CALCIUM CHLORIDE: .6; .31; .03; .02 INJECTION, SOLUTION INTRAVENOUS at 05:09

## 2022-09-18 RX ADMIN — SODIUM CHLORIDE 30 ML: 0.9 INJECTION, SOLUTION INTRAVENOUS at 09:09

## 2022-09-18 RX ADMIN — LATANOPROST 1 DROP: 50 SOLUTION OPHTHALMIC at 09:09

## 2022-09-18 RX ADMIN — CEFTRIAXONE 1 G: 1 INJECTION, SOLUTION INTRAVENOUS at 09:09

## 2022-09-18 RX ADMIN — SODIUM CHLORIDE, SODIUM LACTATE, POTASSIUM CHLORIDE, AND CALCIUM CHLORIDE: .6; .31; .03; .02 INJECTION, SOLUTION INTRAVENOUS at 10:09

## 2022-09-18 RX ADMIN — MORPHINE SULFATE 2 MG: 2 INJECTION, SOLUTION INTRAMUSCULAR; INTRAVENOUS at 09:09

## 2022-09-18 NOTE — ASSESSMENT & PLAN NOTE
Likely aspiration related.  Swallow evaluation.  Chest PT. NPO.  Palliative care consult.  IV Rocephin azithromycin.  DC Flagyl.  Rocephin carries good coverage for anaerobes.  9/17 improved from swallow evaluation noted.  On Rocephin azithromycin.  Likely the plan is to switch to comfort care/hospice on DC.  9/18 complete with Rocephin course for pneumonia.  Completed azithromycin.

## 2022-09-18 NOTE — ASSESSMENT & PLAN NOTE
LR.  Avoid nephrotoxic drugs  9/16 LR IV fluid.  Creatinine 1.4  9/18 creatinine 1.4.  Discontinue daily labs.  LR.  Nebs q 8 hrs

## 2022-09-18 NOTE — PLAN OF CARE
Ongoing (interventions implemented as appropriate)  Pt confused at times  VSS  Pt able to make needs known.  Pt remained afebrile throughout this shift.   Pt remained free of falls this shift.   Pt denies pain this shift.  Plan of care reviewed. Patient verbalizes understanding.   Turn pt. Every 2 hours. . Frequent weight shifting encouraged.  Patient normal sinus rhythm on monitor.   Bed low, side rails up x 2, wheels locked, call light in reach.   Hourly rounding completed.   Will continue to observe.

## 2022-09-18 NOTE — SUBJECTIVE & OBJECTIVE
Interval History: Mental status has improved per sons, but responses are still slowed.     Review of Systems   Unable to perform ROS: Mental status change   Objective:     Vital Signs (Most Recent):  Temp: 97.8 °F (36.6 °C) (09/18/22 0719)  Pulse: 82 (09/18/22 0900)  Resp: 18 (09/18/22 0900)  BP: 139/63 (09/18/22 0719)  SpO2: 98 % (09/18/22 0900) Vital Signs (24h Range):  Temp:  [97.8 °F (36.6 °C)-98.9 °F (37.2 °C)] 97.8 °F (36.6 °C)  Pulse:  [75-89] 82  Resp:  [16-19] 18  SpO2:  [93 %-98 %] 98 %  BP: (131-161)/(60-71) 139/63     Weight: 63.2 kg (139 lb 5.3 oz)  Body mass index is 22.49 kg/m².    Intake/Output Summary (Last 24 hours) at 9/18/2022 0958  Last data filed at 9/18/2022 0600  Gross per 24 hour   Intake 2484.36 ml   Output 700 ml   Net 1784.36 ml        Physical Exam  Constitutional:       General: She is not in acute distress.     Appearance: Normal appearance. She is ill-appearing.   HENT:      Head: Normocephalic and atraumatic.      Mouth/Throat:      Mouth: Mucous membranes are dry.   Cardiovascular:      Rate and Rhythm: Normal rate and regular rhythm.   Pulmonary:      Effort: Pulmonary effort is normal.      Breath sounds: Normal breath sounds.      Comments: Coarse breath sounds   Abdominal:      General: Abdomen is flat. There is no distension.      Palpations: Abdomen is soft.      Tenderness: There is no abdominal tenderness. There is no guarding.   Musculoskeletal:         General: No swelling. Normal range of motion.      Right lower leg: No edema.      Left lower leg: No edema.   Skin:     General: Skin is warm and dry.   Neurological:      Mental Status: She is alert. She is disoriented.         Significant Labs: All pertinent labs within the past 24 hours have been reviewed.  CBC:   Recent Labs   Lab 09/17/22  0531   WBC 6.73   HGB 9.5*   HCT 31.4*          CMP:   Recent Labs   Lab 09/17/22  0531      K 5.1   *   CO2 16*   GLU 81   BUN 31*   CREATININE 1.4   CALCIUM 8.4*    PROT 5.7*   ALBUMIN 2.4*   BILITOT 0.3   ALKPHOS 89   AST 24   ALT 14   ANIONGAP 10         Significant Imaging:   XR CHEST 1 VIEW     CLINICAL HISTORY:  right sided pneumothorax;Pneumothorax, unspecified     FINDINGS:  Single view of the chest.  Comparison 09/15/2022     Cardiac silhouette is normal.  The lungs demonstrate no evidence of active disease.  Right lower lobe atelectasis.  Tiny right apical pneumothorax.  Trace effusion on the right.  Low lung volumes limits evaluation.  Bones appear intact.  Moderate degenerative change.  Aorta demonstrates atherosclerotic disease.     Impression:     See findings above    MRI BRAIN WITHOUT CONTRAST     CLINICAL HISTORY:  Aphasia.  TIA.  Mental status change, unknown cause;     TECHNIQUE:  Standard multiplanar noncontrast sequences of the brain.     COMPARISON:  CT brain 09/15/2022.     FINDINGS:  The ventricles are moderately enlarged as are the extra-axial CSF spaces.     There is moderately extensive white matter hyperintensity, more pronounced in the frontal lobe distribution.     The gradient echo sequence is negative.  No evidence of chronic hemorrhage.     No extra-axial fluid collection is seen.     Skull base appears normal.     The diffusion sequence is negative.     Impression:     No acute or focal process.  Moderately advanced atrophy and white matter degeneration.    CT CHEST ABDOMEN PELVIS WITHOUT CONTRAST(XPD)     CLINICAL HISTORY:  Sepsis;complaining of chest and abdomen pain. Dementia. can't provide accurate history;     TECHNIQUE:  Low dose axial images, sagittal and coronal reformations were obtained from the thoracic inlet to the pubic synthesis .  Oral contrast was not administered. All CT scans at this facility are performed using dose optimization techniques including the following: automated exposure control; adjustment of the mA and/or kV; use of iterative reconstruction technique.     COMPARISON:  Chest radiograph 09/15/2022      FINDINGS:  Beam hardening artifact, patient motion, noncontrast technique somewhat limits evaluation.     Thoracic soft tissues: No significant abnormality.     Aorta: Mildly tortuous, however normal in caliber.  There is severe atherosclerotic calcification.  Significant calcification additionally present at the origin of the left subclavian artery.     Heart: Normal in size small pericardial effusion.  There is severe coronary artery atherosclerosis.  There is significant calcification of the aortic valve.  Additional calcification present at the mitral valve annulus.     Jasmyn/Mediastinum: No significant lymphadenopathy     Lungs: There is a small right pneumothorax.  There is near complete collapse of the right lower lobe with superimposed consolidation.  Opacity present within the right lower lobe bronchial tree.  There is mild ground-glass density within the right middle lobe, which may be due to atelectatic change versus additional infiltrate.  Within the posterior right upper lobe lies a partially calcified 1.1 x 1.3 cm nodule.  The left lung demonstrates dependent density within the posterior aspect of the left lower lobe which may be due to atelectatic change or additional site of infiltrate.  There is a noncalcified 1.0 cm nodule within the lingula (axial series 6, image 208).  There is trace bilateral pleural fluid, right more than left.  No left pneumothorax.  Bronchial tree on the left appears patent..     Liver: Appears normal in size and attenuation without concerning focal hepatic abnormality within the limits of noncontrast technique and beam hardening artifact.  Calcified granulomas noted.     Gallbladder: No calcified gallstones.     Bile Ducts: No evidence of dilated ducts.     Pancreas: No mass or peripancreatic fat stranding.     Spleen: Unremarkable.     Adrenals: Unremarkable.     Kidneys/ Ureters: Kidneys are small in size with a lobular contour, normal invade location.  No nephrolithiasis  or hydronephrosis.  Ureters are normal in course and caliber.  Normal concentration and excretion of contrast. No hydronephrosis or nephrolithiasis. No ureteral dilatation.     Bladder: No evidence of wall thickening.     Reproductive organs: Unremarkable.     GI Tract/Mesentery: Small hiatal hernia.  No evidence of bowel obstruction or inflammation. Significant volume stool within the rectum.  No inflammatory change in the expected region of the appendix to indicate appendicitis.  There are few colonic diverticula without inflammatory change of diverticulitis.  Portions of the sigmoid colon are decompressed with mild mural prominence, suspected related to decompression, though underlying lesion cannot be excluded at these sites, particularly at the rectosigmoid junction (axial series 3, image 140).  Correlate with recent colonoscopy.     Peritoneal Space: No ascites. No free air.     Retroperitoneum: No significant adenopathy.     Abdominal wall: Small fat containing umbilical and right inguinal hernias.     Vasculature: There is severe aortic atherosclerosis.  No aneurysm.     Bones: No acute fracture. There is osteopenia and degenerative change of the spine noting grade 1 retrolisthesis of L2 on L3.  There is mild deformity of the mid sternal body with slightly increased sclerosis at this site.  Correlate for prior injury.     Impression:     Near complete collapse of the right lower lobe with superimposed consolidation, concerning for aspiration or pneumonia.  Opacity present within the right lower lobe bronchial tree, concerning for aspiration, mucous plugging, or endobronchial lesion.     Small right pneumothorax.     Mild deformity of the mid sternal body with slightly increased sclerosis at this site.  Correlate for prior injury.  No definite acute fracture seen.  Underlying osseous lesion cannot be excluded.     Bilateral pulmonary nodules with the largest measuring 1.3 cm on the right, partially calcified.   A noncalcified 1.0 cm nodule present within the lingula.  For a solid nodule >8 mm, Fleischner Society 2017 guidelines recommend considering CT, PET/CT or tissue sampling at 3 months.     Additional findings as above.     Findings discussed with Dr. Soto over the telephone at 10:00 on 09/15/2022.

## 2022-09-18 NOTE — SUBJECTIVE & OBJECTIVE
9/18 O2 sat 98% on 2 liters/minute.  Afebrile.  On oral diet.  Family feeding patient Chest congestion.  Review of Systems   Constitutional:  Positive for activity change, appetite change and fatigue. Negative for chills, fever and unexpected weight change.   HENT:  Negative for drooling, ear discharge and nosebleeds.    Eyes:  Negative for pain, discharge and itching.   Respiratory:  Positive for cough and shortness of breath. Negative for choking.    Cardiovascular:  Negative for chest pain.   Gastrointestinal:  Negative for anal bleeding.   Endocrine: Negative for cold intolerance.   Genitourinary:  Negative for hematuria.   Musculoskeletal:  Positive for arthralgias, back pain and gait problem. Negative for neck stiffness.   Skin:  Negative for rash.   Allergic/Immunologic: Negative for immunocompromised state.   Neurological:  Positive for weakness. Negative for seizures and facial asymmetry.   Hematological:  Negative for adenopathy.   Psychiatric/Behavioral:  Negative for behavioral problems, self-injury and suicidal ideas.         Nonverbal    Objective:     Vital Signs (Most Recent):  Temp: 97.6 °F (36.4 °C) (09/18/22 1120)  Pulse: 76 (09/18/22 1120)  Resp: 18 (09/18/22 1120)  BP: (!) 144/66 (09/18/22 1120)  SpO2: 98 % (09/18/22 1120)   Vital Signs (24h Range):  Temp:  [97.6 °F (36.4 °C)-98.9 °F (37.2 °C)] 97.6 °F (36.4 °C)  Pulse:  [76-88] 76  Resp:  [16-19] 18  SpO2:  [93 %-98 %] 98 %  BP: (131-161)/(60-67) 144/66     Weight: 63.2 kg (139 lb 5.3 oz)  Body mass index is 22.49 kg/m².      Intake/Output Summary (Last 24 hours) at 9/18/2022 1225  Last data filed at 9/18/2022 0600  Gross per 24 hour   Intake 2484.36 ml   Output 700 ml   Net 1784.36 ml         Physical Exam  Vitals and nursing note reviewed.   Constitutional:       General: She is not in acute distress.     Appearance: She is well-developed. She is ill-appearing.   HENT:      Head: Normocephalic and atraumatic.      Nose: Nose normal.   Eyes:       Extraocular Movements: Extraocular movements intact.   Cardiovascular:      Rate and Rhythm: Normal rate and regular rhythm.   Pulmonary:      Effort: Pulmonary effort is normal. No respiratory distress.      Breath sounds: No stridor.   Abdominal:      General: There is no distension.   Genitourinary:     Comments: External urine catheter  Musculoskeletal:         General: No signs of injury.      Cervical back: Normal range of motion and neck supple.   Skin:     General: Skin is warm and dry.   Neurological:      General: No focal deficit present.      Mental Status: She is alert.      Comments: Sleepy   Psychiatric:         Behavior: Behavior normal.       Vents:  Oxygen Concentration (%): 28 (09/18/22 0728)    Lines/Drains/Airways       Drain  Duration             Female External Urinary Catheter 09/16/22 0818 2 days              Peripheral Intravenous Line  Duration                  Peripheral IV - Single Lumen 09/15/22 0541 20 G Left Forearm 3 days                    Significant Labs:    CBC/Anemia Profile:  Recent Labs   Lab 09/17/22  0531   WBC 6.73   HGB 9.5*   HCT 31.4*      MCV 98   RDW 14.2          Chemistries:  Recent Labs   Lab 09/17/22  0531      K 5.1   *   CO2 16*   BUN 31*   CREATININE 1.4   CALCIUM 8.4*   ALBUMIN 2.4*   PROT 5.7*   BILITOT 0.3   ALKPHOS 89   ALT 14   AST 24         MRI brain 09/15/2022 no acute finding          Significant Imaging:     CT chest abdomen pelvis 09/15/2022    EXAMINATION:  CT CHEST ABDOMEN PELVIS WITHOUT CONTRAST(XPD)     CLINICAL HISTORY:  Sepsis;complaining of chest and abdomen pain. Dementia. can't provide accurate history;     TECHNIQUE:  Low dose axial images, sagittal and coronal reformations were obtained from the thoracic inlet to the pubic synthesis .  Oral contrast was not administered. All CT scans at this facility are performed using dose optimization techniques including the following: automated exposure control; adjustment of  the mA and/or kV; use of iterative reconstruction technique.     COMPARISON:  Chest radiograph 09/15/2022     FINDINGS:  Beam hardening artifact, patient motion, noncontrast technique somewhat limits evaluation.     Thoracic soft tissues: No significant abnormality.     Aorta: Mildly tortuous, however normal in caliber.  There is severe atherosclerotic calcification.  Significant calcification additionally present at the origin of the left subclavian artery.     Heart: Normal in size small pericardial effusion.  There is severe coronary artery atherosclerosis.  There is significant calcification of the aortic valve.  Additional calcification present at the mitral valve annulus.     Jasmyn/Mediastinum: No significant lymphadenopathy     Lungs: There is a small right pneumothorax.  There is near complete collapse of the right lower lobe with superimposed consolidation.  Opacity present within the right lower lobe bronchial tree.  There is mild ground-glass density within the right middle lobe, which may be due to atelectatic change versus additional infiltrate.  Within the posterior right upper lobe lies a partially calcified 1.1 x 1.3 cm nodule.  The left lung demonstrates dependent density within the posterior aspect of the left lower lobe which may be due to atelectatic change or additional site of infiltrate.  There is a noncalcified 1.0 cm nodule within the lingula (axial series 6, image 208).  There is trace bilateral pleural fluid, right more than left.  No left pneumothorax.  Bronchial tree on the left appears patent..     Liver: Appears normal in size and attenuation without concerning focal hepatic abnormality within the limits of noncontrast technique and beam hardening artifact.  Calcified granulomas noted.     Gallbladder: No calcified gallstones.     Bile Ducts: No evidence of dilated ducts.     Pancreas: No mass or peripancreatic fat stranding.     Spleen: Unremarkable.     Adrenals: Unremarkable.      Kidneys/ Ureters: Kidneys are small in size with a lobular contour, normal invade location.  No nephrolithiasis or hydronephrosis.  Ureters are normal in course and caliber.  Normal concentration and excretion of contrast. No hydronephrosis or nephrolithiasis. No ureteral dilatation.     Bladder: No evidence of wall thickening.     Reproductive organs: Unremarkable.     GI Tract/Mesentery: Small hiatal hernia.  No evidence of bowel obstruction or inflammation. Significant volume stool within the rectum.  No inflammatory change in the expected region of the appendix to indicate appendicitis.  There are few colonic diverticula without inflammatory change of diverticulitis.  Portions of the sigmoid colon are decompressed with mild mural prominence, suspected related to decompression, though underlying lesion cannot be excluded at these sites, particularly at the rectosigmoid junction (axial series 3, image 140).  Correlate with recent colonoscopy.     Peritoneal Space: No ascites. No free air.     Retroperitoneum: No significant adenopathy.     Abdominal wall: Small fat containing umbilical and right inguinal hernias.     Vasculature: There is severe aortic atherosclerosis.  No aneurysm.     Bones: No acute fracture. There is osteopenia and degenerative change of the spine noting grade 1 retrolisthesis of L2 on L3.  There is mild deformity of the mid sternal body with slightly increased sclerosis at this site.  Correlate for prior injury.     Impression:     Near complete collapse of the right lower lobe with superimposed consolidation, concerning for aspiration or pneumonia.  Opacity present within the right lower lobe bronchial tree, concerning for aspiration, mucous plugging, or endobronchial lesion.     Small right pneumothorax.     Mild deformity of the mid sternal body with slightly increased sclerosis at this site.  Correlate for prior injury.  No definite acute fracture seen.  Underlying osseous lesion cannot  be excluded.     Bilateral pulmonary nodules with the largest measuring 1.3 cm on the right, partially calcified.  A noncalcified 1.0 cm nodule present within the lingula.  For a solid nodule >8 mm, Fleischner Society 2017 guidelines recommend considering CT, PET/CT or tissue sampling at 3 months.

## 2022-09-18 NOTE — PROGRESS NOTES
[facility] - Med Surg 3  [facility] Medicine  Progress Note    Patient Name: Nargis Pastrana  MRN: [id number]  Patient Class: IP- Inpatient   Admission Date: 9/15/2022  Length of Stay: 2 days  Attending Physician: Jaime Cruz MD  Primary Care Provider: Paul Modi MD        Subjective:     Principal Problem:Right lower lobe pneumonia        HPI:  89 y.o. female patient with a PMHx of renal disorder and hyperlipidemia who presents to the Emergency Department for evaluation of AMS. Pt is a resident at [facility] who was sent to the ED by staff who state pt began suddenly moaning and rubbing epigastric area but not verbalizing a complaint. Staff at [facility] state this is not the pt's normal behavior. CT chest shows RLL consolidation and lung collapse. There is also small apical PTX, CTS consulted.       Overview/Hospital Course:  Nargis Pastrana is a 89-year-old female who was admitted to [facility] for metabolic encephalopathy and severe right lower lobe pneumonia.  CT showed a near collapse of the right lung due to severe consolidation as well as opacity in the right lower lobe which could represent aspiration or mucus plugging. Incidental finding of 1cm right upper lobe nodule. Seen by pulmonology who recommended SLP and palliative care consult due to concern of chronic aspiration. Will continue IV abx. Small uncomplicated pneumothorax. Patient was seen by CT surgery who repeated chest x-ray today which demonstrated a tiny apical pneumothorax.  Surgical intervention was not recommended.  CVA or other neurologic lesion ruled out by MRI.    9/17: NAEO, afebrile. Continue PO intake per norm, planning to d/c 9/19 AM with hospice to [facility]. Abx treatment for aspiration PNA      Interval History: Mental status has improved per sons, but responses are still slowed.     Review of Systems   Unable to perform ROS: Mental status change   Objective:     Vital Signs (Most Recent):  Temp: 97.8 °F  (36.6 °C) (09/18/22 0719)  Pulse: 82 (09/18/22 0900)  Resp: 18 (09/18/22 0900)  BP: 139/63 (09/18/22 0719)  SpO2: 98 % (09/18/22 0900) Vital Signs (24h Range):  Temp:  [97.8 °F (36.6 °C)-98.9 °F (37.2 °C)] 97.8 °F (36.6 °C)  Pulse:  [75-89] 82  Resp:  [16-19] 18  SpO2:  [93 %-98 %] 98 %  BP: (131-161)/(60-71) 139/63     Weight: 63.2 kg (139 lb 5.3 oz)  Body mass index is 22.49 kg/m².    Intake/Output Summary (Last 24 hours) at 9/18/2022 0958  Last data filed at 9/18/2022 0600  Gross per 24 hour   Intake 2484.36 ml   Output 700 ml   Net 1784.36 ml        Physical Exam  Constitutional:       General: She is not in acute distress.     Appearance: Normal appearance. She is ill-appearing.   HENT:      Head: Normocephalic and atraumatic.      Mouth/Throat:      Mouth: Mucous membranes are dry.   Cardiovascular:      Rate and Rhythm: Normal rate and regular rhythm.   Pulmonary:      Effort: Pulmonary effort is normal.      Breath sounds: Normal breath sounds.      Comments: Coarse breath sounds   Abdominal:      General: Abdomen is flat. There is no distension.      Palpations: Abdomen is soft.      Tenderness: There is no abdominal tenderness. There is no guarding.   Musculoskeletal:         General: No swelling. Normal range of motion.      Right lower leg: No edema.      Left lower leg: No edema.   Skin:     General: Skin is warm and dry.   Neurological:      Mental Status: She is alert. She is disoriented.         Significant Labs: All pertinent labs within the past 24 hours have been reviewed.  CBC:   Recent Labs   Lab 09/17/22  0531   WBC 6.73   HGB 9.5*   HCT 31.4*          CMP:   Recent Labs   Lab 09/17/22  0531      K 5.1   *   CO2 16*   GLU 81   BUN 31*   CREATININE 1.4   CALCIUM 8.4*   PROT 5.7*   ALBUMIN 2.4*   BILITOT 0.3   ALKPHOS 89   AST 24   ALT 14   ANIONGAP 10         Significant Imaging:   XR CHEST 1 VIEW     CLINICAL HISTORY:  right sided pneumothorax;Pneumothorax, unspecified      FINDINGS:  Single view of the chest.  Comparison 09/15/2022     Cardiac silhouette is normal.  The lungs demonstrate no evidence of active disease.  Right lower lobe atelectasis.  Tiny right apical pneumothorax.  Trace effusion on the right.  Low lung volumes limits evaluation.  Bones appear intact.  Moderate degenerative change.  Aorta demonstrates atherosclerotic disease.     Impression:     See findings above    MRI BRAIN WITHOUT CONTRAST     CLINICAL HISTORY:  Aphasia.  TIA.  Mental status change, unknown cause;     TECHNIQUE:  Standard multiplanar noncontrast sequences of the brain.     COMPARISON:  CT brain 09/15/2022.     FINDINGS:  The ventricles are moderately enlarged as are the extra-axial CSF spaces.     There is moderately extensive white matter hyperintensity, more pronounced in the frontal lobe distribution.     The gradient echo sequence is negative.  No evidence of chronic hemorrhage.     No extra-axial fluid collection is seen.     Skull base appears normal.     The diffusion sequence is negative.     Impression:     No acute or focal process.  Moderately advanced atrophy and white matter degeneration.    CT CHEST ABDOMEN PELVIS WITHOUT CONTRAST(XPD)     CLINICAL HISTORY:  Sepsis;complaining of chest and abdomen pain. Dementia. can't provide accurate history;     TECHNIQUE:  Low dose axial images, sagittal and coronal reformations were obtained from the thoracic inlet to the pubic synthesis .  Oral contrast was not administered. All CT scans at this facility are performed using dose optimization techniques including the following: automated exposure control; adjustment of the mA and/or kV; use of iterative reconstruction technique.     COMPARISON:  Chest radiograph 09/15/2022     FINDINGS:  Beam hardening artifact, patient motion, noncontrast technique somewhat limits evaluation.     Thoracic soft tissues: No significant abnormality.     Aorta: Mildly tortuous, however normal in caliber.  There  is severe atherosclerotic calcification.  Significant calcification additionally present at the origin of the left subclavian artery.     Heart: Normal in size small pericardial effusion.  There is severe coronary artery atherosclerosis.  There is significant calcification of the aortic valve.  Additional calcification present at the mitral valve annulus.     Jasmyn/Mediastinum: No significant lymphadenopathy     Lungs: There is a small right pneumothorax.  There is near complete collapse of the right lower lobe with superimposed consolidation.  Opacity present within the right lower lobe bronchial tree.  There is mild ground-glass density within the right middle lobe, which may be due to atelectatic change versus additional infiltrate.  Within the posterior right upper lobe lies a partially calcified 1.1 x 1.3 cm nodule.  The left lung demonstrates dependent density within the posterior aspect of the left lower lobe which may be due to atelectatic change or additional site of infiltrate.  There is a noncalcified 1.0 cm nodule within the lingula (axial series 6, image 208).  There is trace bilateral pleural fluid, right more than left.  No left pneumothorax.  Bronchial tree on the left appears patent..     Liver: Appears normal in size and attenuation without concerning focal hepatic abnormality within the limits of noncontrast technique and beam hardening artifact.  Calcified granulomas noted.     Gallbladder: No calcified gallstones.     Bile Ducts: No evidence of dilated ducts.     Pancreas: No mass or peripancreatic fat stranding.     Spleen: Unremarkable.     Adrenals: Unremarkable.     Kidneys/ Ureters: Kidneys are small in size with a lobular contour, normal invade location.  No nephrolithiasis or hydronephrosis.  Ureters are normal in course and caliber.  Normal concentration and excretion of contrast. No hydronephrosis or nephrolithiasis. No ureteral dilatation.     Bladder: No evidence of wall thickening.      Reproductive organs: Unremarkable.     GI Tract/Mesentery: Small hiatal hernia.  No evidence of bowel obstruction or inflammation. Significant volume stool within the rectum.  No inflammatory change in the expected region of the appendix to indicate appendicitis.  There are few colonic diverticula without inflammatory change of diverticulitis.  Portions of the sigmoid colon are decompressed with mild mural prominence, suspected related to decompression, though underlying lesion cannot be excluded at these sites, particularly at the rectosigmoid junction (axial series 3, image 140).  Correlate with recent colonoscopy.     Peritoneal Space: No ascites. No free air.     Retroperitoneum: No significant adenopathy.     Abdominal wall: Small fat containing umbilical and right inguinal hernias.     Vasculature: There is severe aortic atherosclerosis.  No aneurysm.     Bones: No acute fracture. There is osteopenia and degenerative change of the spine noting grade 1 retrolisthesis of L2 on L3.  There is mild deformity of the mid sternal body with slightly increased sclerosis at this site.  Correlate for prior injury.     Impression:     Near complete collapse of the right lower lobe with superimposed consolidation, concerning for aspiration or pneumonia.  Opacity present within the right lower lobe bronchial tree, concerning for aspiration, mucous plugging, or endobronchial lesion.     Small right pneumothorax.     Mild deformity of the mid sternal body with slightly increased sclerosis at this site.  Correlate for prior injury.  No definite acute fracture seen.  Underlying osseous lesion cannot be excluded.     Bilateral pulmonary nodules with the largest measuring 1.3 cm on the right, partially calcified.  A noncalcified 1.0 cm nodule present within the lingula.  For a solid nodule >8 mm, Fleischner Society 2017 guidelines recommend considering CT, PET/CT or tissue sampling at 3 months.     Additional findings as  above.     Findings discussed with Dr. Soto over the telephone at 10:00 on 09/15/2022.      Assessment/Plan:      * Right lower lobe pneumonia  S/t aspiration  Empiric Abx  ST to eval and treat    9/16/22  CT showed near complete collapse related to pneumonia and opacity in the right lower bronchial tree that could be related to aspiration or mucous plugging. Pulmonology has been consulted for further assistance.  --IV Azithromycin/Rocephin. Add Flagyl  --awaiting SLP evaluation    Acute metabolic encephalopathy  Suspect related to acute infection as symptom are gradually improving.   CT head showed possible chronic lesion but MRI negative.         Primary spontaneous pneumothorax  Small, apical  CTS consulted, f/u recs  Repeat CXR    9/16/22  CXR today reviewed without worsening. Surgical intervention not warranted. Recommends 2 week follow up    Lung nodule  1.1x 1.3 cm nodule  Pulmonology consulted.  monitor    Constipation  Enema for BM      TRAV (acute kidney injury)  Patient with acute kidney injury likely due to IVVD/dehydration TRAV is currently stable. Labs reviewed- Renal function/electrolytes with Estimated Creatinine Clearance: 16.1 mL/min (A) (based on SCr of 1.6 mg/dL (H)). according to latest data. Monitor urine output and serial BMP and adjust therapy as needed. Avoid nephrotoxins and renally dose meds for GFR listed above.     S/t above  IV fluids, trend Cr    9/16/22  Trends reviewed. Appears to at baseline.       VTE Risk Mitigation (From admission, onward)         Ordered     IP VTE HIGH RISK PATIENT  Once         09/15/22 1521     Place sequential compression device  Until discontinued         09/15/22 1521                Discharge Planning   TERE:      Code Status: DNR   Is the patient medically ready for discharge?:     Reason for patient still in hospital (select all that apply): Patient trending condition, Treatment and Pending disposition  Discharge Plan A: Return to nursing home   Discharge  Delays: None known at this time              Jaime Cruz MD  Department of Hospital Medicine   [facility] - Med Surg 3

## 2022-09-18 NOTE — ASSESSMENT & PLAN NOTE
Treat right lung pneumonia.  9/18 complete with Rocephin course for pneumonia.  Completed azithromycin.

## 2022-09-18 NOTE — PLAN OF CARE
Problem: Fall Injury Risk  Goal: Absence of Fall and Fall-Related Injury  Intervention: Identify and Manage Contributors  Flowsheets (Taken 9/18/2022 0020)  Self-Care Promotion: independence encouraged  Medication Review/Management: medications reviewed  Intervention: Promote Injury-Free Environment  Flowsheets (Taken 9/18/2022 0020)  Safety Promotion/Fall Prevention:   assistive device/personal item within reach   bed alarm set   Fall Risk reviewed with patient/family   medications reviewed   instructed to call staff for mobility     Problem: Skin Injury Risk Increased  Goal: Skin Health and Integrity  Intervention: Promote and Optimize Oral Intake  Flowsheets (Taken 9/18/2022 0020)  Oral Nutrition Promotion:   rest periods promoted   safe use of adaptive equipment encouraged     Problem: Fluid and Electrolyte Imbalance (Acute Kidney Injury/Impairment)  Goal: Fluid and Electrolyte Balance  Intervention: Monitor and Manage Fluid and Electrolyte Balance  Flowsheets (Taken 9/18/2022 0020)  Fluid/Electrolyte Management: intravenous fluid replacement initiated     Problem: Fluid Imbalance (Pneumonia)  Goal: Fluid Balance  Intervention: Monitor and Manage Fluid Balance  Flowsheets (Taken 9/18/2022 0020)  Fluid/Electrolyte Management: intravenous fluid replacement initiated     Problem: Infection (Pneumonia)  Goal: Resolution of Infection Signs and Symptoms  Intervention: Prevent Infection Progression  Flowsheets (Taken 9/18/2022 0020)  Fever Reduction/Comfort Measures:   lightweight clothing   lightweight bedding  Infection Management: aseptic technique maintained     Problem: Respiratory Compromise (Pneumonia)  Goal: Effective Oxygenation and Ventilation  Intervention: Promote Airway Secretion Clearance  Flowsheets (Taken 9/18/2022 0020)  Breathing Techniques/Airway Clearance: deep/controlled cough encouraged  Intervention: Optimize Oxygenation and Ventilation  Flowsheets (Taken 9/18/2022 0020)  Airway/Ventilation  Management: airway patency maintained

## 2022-09-18 NOTE — PROGRESS NOTES
[facility] - Med Surg 3  Pulmonology  Progress Note    Patient Name: Nargis Pastrana  MRN: [id number]  Admission Date: 9/15/2022  Hospital Length of Stay: 2 days  Code Status: DNR  Attending Provider: Jaime Cruz MD  Primary Care Provider: Paul Modi MD   Principal Problem: Right lower lobe pneumonia    Subjective:     9/18 O2 sat 98% on 2 liters/minute.  Afebrile.  On oral diet.  Family feeding patient Chest congestion.  Review of Systems   Constitutional:  Positive for activity change, appetite change and fatigue. Negative for chills, fever and unexpected weight change.   HENT:  Negative for drooling, ear discharge and nosebleeds.    Eyes:  Negative for pain, discharge and itching.   Respiratory:  Positive for cough and shortness of breath. Negative for choking.    Cardiovascular:  Negative for chest pain.   Gastrointestinal:  Negative for anal bleeding.   Endocrine: Negative for cold intolerance.   Genitourinary:  Negative for hematuria.   Musculoskeletal:  Positive for arthralgias, back pain and gait problem. Negative for neck stiffness.   Skin:  Negative for rash.   Allergic/Immunologic: Negative for immunocompromised state.   Neurological:  Positive for weakness. Negative for seizures and facial asymmetry.   Hematological:  Negative for adenopathy.   Psychiatric/Behavioral:  Negative for behavioral problems, self-injury and suicidal ideas.         Nonverbal    Objective:     Vital Signs (Most Recent):  Temp: 97.6 °F (36.4 °C) (09/18/22 1120)  Pulse: 76 (09/18/22 1120)  Resp: 18 (09/18/22 1120)  BP: (!) 144/66 (09/18/22 1120)  SpO2: 98 % (09/18/22 1120)   Vital Signs (24h Range):  Temp:  [97.6 °F (36.4 °C)-98.9 °F (37.2 °C)] 97.6 °F (36.4 °C)  Pulse:  [76-88] 76  Resp:  [16-19] 18  SpO2:  [93 %-98 %] 98 %  BP: (131-161)/(60-67) 144/66     Weight: 63.2 kg (139 lb 5.3 oz)  Body mass index is 22.49 kg/m².      Intake/Output Summary (Last 24 hours) at 9/18/2022 1225  Last data filed at 9/18/2022 0600  Gross per  24 hour   Intake 2484.36 ml   Output 700 ml   Net 1784.36 ml         Physical Exam  Vitals and nursing note reviewed.   Constitutional:       General: She is not in acute distress.     Appearance: She is well-developed. She is ill-appearing.   HENT:      Head: Normocephalic and atraumatic.      Nose: Nose normal.   Eyes:      Extraocular Movements: Extraocular movements intact.   Cardiovascular:      Rate and Rhythm: Normal rate and regular rhythm.   Pulmonary:      Effort: Pulmonary effort is normal. No respiratory distress.      Breath sounds: No stridor.   Abdominal:      General: There is no distension.   Genitourinary:     Comments: External urine catheter  Musculoskeletal:         General: No signs of injury.      Cervical back: Normal range of motion and neck supple.   Skin:     General: Skin is warm and dry.   Neurological:      General: No focal deficit present.      Mental Status: She is alert.      Comments: Sleepy   Psychiatric:         Behavior: Behavior normal.       Vents:  Oxygen Concentration (%): 28 (09/18/22 0728)    Lines/Drains/Airways       Drain  Duration             Female External Urinary Catheter 09/16/22 0818 2 days              Peripheral Intravenous Line  Duration                  Peripheral IV - Single Lumen 09/15/22 0541 20 G Left Forearm 3 days                    Significant Labs:    CBC/Anemia Profile:  Recent Labs   Lab 09/17/22  0531   WBC 6.73   HGB 9.5*   HCT 31.4*      MCV 98   RDW 14.2          Chemistries:  Recent Labs   Lab 09/17/22  0531      K 5.1   *   CO2 16*   BUN 31*   CREATININE 1.4   CALCIUM 8.4*   ALBUMIN 2.4*   PROT 5.7*   BILITOT 0.3   ALKPHOS 89   ALT 14   AST 24         MRI brain 09/15/2022 no acute finding          Significant Imaging:     CT chest abdomen pelvis 09/15/2022    EXAMINATION:  CT CHEST ABDOMEN PELVIS WITHOUT CONTRAST(XPD)     CLINICAL HISTORY:  Sepsis;complaining of chest and abdomen pain. Dementia. can't provide accurate  history;     TECHNIQUE:  Low dose axial images, sagittal and coronal reformations were obtained from the thoracic inlet to the pubic synthesis .  Oral contrast was not administered. All CT scans at this facility are performed using dose optimization techniques including the following: automated exposure control; adjustment of the mA and/or kV; use of iterative reconstruction technique.     COMPARISON:  Chest radiograph 09/15/2022     FINDINGS:  Beam hardening artifact, patient motion, noncontrast technique somewhat limits evaluation.     Thoracic soft tissues: No significant abnormality.     Aorta: Mildly tortuous, however normal in caliber.  There is severe atherosclerotic calcification.  Significant calcification additionally present at the origin of the left subclavian artery.     Heart: Normal in size small pericardial effusion.  There is severe coronary artery atherosclerosis.  There is significant calcification of the aortic valve.  Additional calcification present at the mitral valve annulus.     Jasmyn/Mediastinum: No significant lymphadenopathy     Lungs: There is a small right pneumothorax.  There is near complete collapse of the right lower lobe with superimposed consolidation.  Opacity present within the right lower lobe bronchial tree.  There is mild ground-glass density within the right middle lobe, which may be due to atelectatic change versus additional infiltrate.  Within the posterior right upper lobe lies a partially calcified 1.1 x 1.3 cm nodule.  The left lung demonstrates dependent density within the posterior aspect of the left lower lobe which may be due to atelectatic change or additional site of infiltrate.  There is a noncalcified 1.0 cm nodule within the lingula (axial series 6, image 208).  There is trace bilateral pleural fluid, right more than left.  No left pneumothorax.  Bronchial tree on the left appears patent..     Liver: Appears normal in size and attenuation without concerning  focal hepatic abnormality within the limits of noncontrast technique and beam hardening artifact.  Calcified granulomas noted.     Gallbladder: No calcified gallstones.     Bile Ducts: No evidence of dilated ducts.     Pancreas: No mass or peripancreatic fat stranding.     Spleen: Unremarkable.     Adrenals: Unremarkable.     Kidneys/ Ureters: Kidneys are small in size with a lobular contour, normal invade location.  No nephrolithiasis or hydronephrosis.  Ureters are normal in course and caliber.  Normal concentration and excretion of contrast. No hydronephrosis or nephrolithiasis. No ureteral dilatation.     Bladder: No evidence of wall thickening.     Reproductive organs: Unremarkable.     GI Tract/Mesentery: Small hiatal hernia.  No evidence of bowel obstruction or inflammation. Significant volume stool within the rectum.  No inflammatory change in the expected region of the appendix to indicate appendicitis.  There are few colonic diverticula without inflammatory change of diverticulitis.  Portions of the sigmoid colon are decompressed with mild mural prominence, suspected related to decompression, though underlying lesion cannot be excluded at these sites, particularly at the rectosigmoid junction (axial series 3, image 140).  Correlate with recent colonoscopy.     Peritoneal Space: No ascites. No free air.     Retroperitoneum: No significant adenopathy.     Abdominal wall: Small fat containing umbilical and right inguinal hernias.     Vasculature: There is severe aortic atherosclerosis.  No aneurysm.     Bones: No acute fracture. There is osteopenia and degenerative change of the spine noting grade 1 retrolisthesis of L2 on L3.  There is mild deformity of the mid sternal body with slightly increased sclerosis at this site.  Correlate for prior injury.     Impression:     Near complete collapse of the right lower lobe with superimposed consolidation, concerning for aspiration or pneumonia.  Opacity present  within the right lower lobe bronchial tree, concerning for aspiration, mucous plugging, or endobronchial lesion.     Small right pneumothorax.     Mild deformity of the mid sternal body with slightly increased sclerosis at this site.  Correlate for prior injury.  No definite acute fracture seen.  Underlying osseous lesion cannot be excluded.     Bilateral pulmonary nodules with the largest measuring 1.3 cm on the right, partially calcified.  A noncalcified 1.0 cm nodule present within the lingula.  For a solid nodule >8 mm, Fleischner Society 2017 guidelines recommend considering CT, PET/CT or tissue sampling at 3 months.          ABG  No results for input(s): PH, PO2, PCO2, HCO3, BE in the last 168 hours.  Assessment/Plan:     * Right lower lobe pneumonia  Likely aspiration related.  Swallow evaluation.  Chest PT. NPO.  Palliative care consult.  IV Rocephin azithromycin.  DC Flagyl.  Rocephin carries good coverage for anaerobes.  9/17 improved from swallow evaluation noted.  On Rocephin azithromycin.  Likely the plan is to switch to comfort care/hospice on DC.  9/18 complete with Rocephin course for pneumonia.  Completed azithromycin.    Palliative care encounter  Plan to DC with comfort care.    TRAV (acute kidney injury)  LR.  Avoid nephrotoxic drugs  9/16 LR IV fluid.  Creatinine 1.4  9/18 creatinine 1.4.  Discontinue daily labs.  LR.  Nebs q 8 hrs       Acute metabolic encephalopathy  Treat right lung pneumonia.  9/18 complete with Rocephin course for pneumonia.  Completed azithromycin.           Hernando Young MD  Pulmonology  [facility]

## 2022-09-19 VITALS
SYSTOLIC BLOOD PRESSURE: 130 MMHG | HEIGHT: 66 IN | BODY MASS INDEX: 21.62 KG/M2 | HEART RATE: 78 BPM | DIASTOLIC BLOOD PRESSURE: 58 MMHG | RESPIRATION RATE: 18 BRPM | TEMPERATURE: 98 F | WEIGHT: 134.5 LBS | OXYGEN SATURATION: 97 %

## 2022-09-19 PROCEDURE — 63600175 PHARM REV CODE 636 W HCPCS: Performed by: NURSE PRACTITIONER

## 2022-09-19 PROCEDURE — 94761 N-INVAS EAR/PLS OXIMETRY MLT: CPT

## 2022-09-19 PROCEDURE — 63600175 PHARM REV CODE 636 W HCPCS: Performed by: INTERNAL MEDICINE

## 2022-09-19 PROCEDURE — 94664 DEMO&/EVAL PT USE INHALER: CPT

## 2022-09-19 PROCEDURE — 99232 SBSQ HOSP IP/OBS MODERATE 35: CPT | Mod: ,,, | Performed by: INTERNAL MEDICINE

## 2022-09-19 PROCEDURE — 25000003 PHARM REV CODE 250: Performed by: STUDENT IN AN ORGANIZED HEALTH CARE EDUCATION/TRAINING PROGRAM

## 2022-09-19 PROCEDURE — 94640 AIRWAY INHALATION TREATMENT: CPT

## 2022-09-19 PROCEDURE — 99232 PR SUBSEQUENT HOSPITAL CARE,LEVL II: ICD-10-PCS | Mod: ,,, | Performed by: INTERNAL MEDICINE

## 2022-09-19 PROCEDURE — 25000242 PHARM REV CODE 250 ALT 637 W/ HCPCS: Performed by: INTERNAL MEDICINE

## 2022-09-19 RX ORDER — POLYETHYLENE GLYCOL 3350 17 G/17G
17 POWDER, FOR SOLUTION ORAL DAILY
Qty: 510 G | Refills: 0 | Status: SHIPPED | OUTPATIENT
Start: 2022-09-19 | End: 2022-10-19

## 2022-09-19 RX ORDER — OXYCODONE AND ACETAMINOPHEN 10; 325 MG/1; MG/1
1 TABLET ORAL EVERY 4 HOURS PRN
Status: DISCONTINUED | OUTPATIENT
Start: 2022-09-19 | End: 2022-09-19 | Stop reason: HOSPADM

## 2022-09-19 RX ADMIN — IPRATROPIUM BROMIDE AND ALBUTEROL SULFATE 3 ML: 2.5; .5 SOLUTION RESPIRATORY (INHALATION) at 07:09

## 2022-09-19 RX ADMIN — OXYCODONE AND ACETAMINOPHEN 1 TABLET: 10; 325 TABLET ORAL at 04:09

## 2022-09-19 RX ADMIN — SODIUM CHLORIDE, SODIUM LACTATE, POTASSIUM CHLORIDE, AND CALCIUM CHLORIDE: .6; .31; .03; .02 INJECTION, SOLUTION INTRAVENOUS at 01:09

## 2022-09-19 RX ADMIN — IPRATROPIUM BROMIDE AND ALBUTEROL SULFATE 3 ML: 2.5; .5 SOLUTION RESPIRATORY (INHALATION) at 12:09

## 2022-09-19 RX ADMIN — GUAIFENESIN 600 MG: 600 TABLET, EXTENDED RELEASE ORAL at 08:09

## 2022-09-19 RX ADMIN — SODIUM CHLORIDE 30 ML: 0.9 INJECTION, SOLUTION INTRAVENOUS at 08:09

## 2022-09-19 RX ADMIN — CEFTRIAXONE 1 G: 1 INJECTION, SOLUTION INTRAVENOUS at 08:09

## 2022-09-19 NOTE — ASSESSMENT & PLAN NOTE
Patient with acute kidney injury likely due to IVVD/dehydration TRAV is currently stable. Labs reviewed- Renal function/electrolytes with Estimated Creatinine Clearance: 19.5 mL/min (based on SCr of 1.4 mg/dL). according to latest data. Monitor urine output and serial BMP and adjust therapy as needed. Avoid nephrotoxins and renally dose meds for GFR listed above.     S/t above  IV fluids, trend Cr    9/16/22  Trends reviewed. Appears to at baseline.

## 2022-09-19 NOTE — DISCHARGE SUMMARY
[facility]  Hospital Medicine  Discharge Summary      Patient Name: Nargis Pastrana  MRN: [id number]  Patient Class: IP- Inpatient  Admission Date: 9/15/2022  Hospital Length of Stay: 3 days  Discharge Date and Time:  09/19/2022 10:09 AM  Attending Physician: Jaime Cruz MD   Discharging Provider: Jaime Cruz MD  Primary Care Provider: Paul Modi MD      HPI:   89 y.o. female patient with a PMHx of renal disorder and hyperlipidemia who presents to the Emergency Department for evaluation of AMS. Pt is a resident at [facility] who was sent to the ED by staff who state pt began suddenly moaning and rubbing epigastric area but not verbalizing a complaint. Staff at [facility] state this is not the pt's normal behavior. CT chest shows RLL consolidation and lung collapse. There is also small apical PTX, CTS consulted.       * No surgery found *      Hospital Course:   Nargis Pastrana is a 89-year-old female who was admitted to [facility] for metabolic encephalopathy and severe right lower lobe pneumonia.  CT showed a near collapse of the right lung due to severe consolidation as well as opacity in the right lower lobe which could represent aspiration or mucus plugging. Incidental finding of 1cm right upper lobe nodule. Seen by pulmonology who recommended SLP and palliative care consult due to concern of chronic aspiration. Will continue IV abx. Small uncomplicated pneumothorax. Patient was seen by CT surgery who repeated chest x-ray today which demonstrated a tiny apical pneumothorax.  Surgical intervention was not recommended.  CVA or other neurologic lesion ruled out by MRI.    9/17: NAEO, afebrile. Continue PO intake per norm, planning to d/c 9/19 AM with hospice to NH. Abx treatment for aspiration PNA       Goals of Care Treatment Preferences:  Code Status: DNR      Consults:   Consults (From admission, onward)        Status Ordering Provider     Inpatient consult to  Social Work  Once        Provider:  (Not yet assigned)    Completed HENNA TOVAR     Inpatient consult to Palliative Care  Once        Provider:  Henna Tovar PA-C    Completed ROCÍO ADHIKARI     Inpatient consult to Pulmonology  Once        Provider:  Hernando Young MD    Completed ROCÍO ADHIKARI          * Right lower lobe pneumonia  S/t aspiration  Empiric Abx  ST to eval and treat    9/16/22  CT showed near complete collapse related to pneumonia and opacity in the right lower bronchial tree that could be related to aspiration or mucous plugging. Pulmonology has been consulted for further assistance.  --IV Azithromycin/Rocephin. Add Flagyl  --awaiting SLP evaluation    Acute metabolic encephalopathy  Suspect related to acute infection as symptom are gradually improving.   CT head showed possible chronic lesion but MRI negative.         Primary spontaneous pneumothorax  Small, apical  CTS consulted, f/u recs  Repeat CXR    9/16/22  CXR today reviewed without worsening. Surgical intervention not warranted. Recommends 2 week follow up    Constipation  Enema for BM      TRAV (acute kidney injury)  Patient with acute kidney injury likely due to IVVD/dehydration TRAV is currently stable. Labs reviewed- Renal function/electrolytes with Estimated Creatinine Clearance: 19.5 mL/min (based on SCr of 1.4 mg/dL). according to latest data. Monitor urine output and serial BMP and adjust therapy as needed. Avoid nephrotoxins and renally dose meds for GFR listed above.     S/t above  IV fluids, trend Cr    9/16/22  Trends reviewed. Appears to at baseline.       Final Active Diagnoses:    Diagnosis Date Noted POA    PRINCIPAL PROBLEM:  Right lower lobe pneumonia [J18.9] 09/15/2022 Yes    Acute metabolic encephalopathy [G93.41] 09/15/2022 Yes    Primary spontaneous pneumothorax [J93.11] 09/15/2022 Yes    Lung nodule [R91.1] 09/16/2022 Yes    Palliative care encounter [Z51.5] 09/16/2022 Not Applicable     TRAV (acute kidney injury) [N17.9] 09/15/2022 Yes    Constipation [K59.00] 09/15/2022 Yes      Problems Resolved During this Admission:       Discharged Condition: stable    Disposition: Home or Self Care    Follow Up:    Patient Instructions:      Diet Adult Regular     Activity as tolerated       Significant Diagnostic Studies: Labs: All labs within the past 24 hours have been reviewed    Pending Diagnostic Studies:     None         Medications:  Reconciled Home Medications:      Medication List      START taking these medications    polyethylene glycol 17 gram/dose powder  Commonly known as: GLYCOLAX  Take 17 g by mouth once daily.        CONTINUE taking these medications    latanoprost 0.005 % ophthalmic solution  Place 1 drop into both eyes every evening.     mirtazapine 7.5 MG Tab  Commonly known as: REMERON  Take 7.5 mg by mouth every evening.        STOP taking these medications    docusate calcium 240 mg capsule  Commonly known as: SURFAK            Indwelling Lines/Drains at time of discharge:   Lines/Drains/Airways     Drain  Duration           Female External Urinary Catheter 09/16/22 0818 3 days                Time spent on the discharge of patient: 40 minutes         Jaime Cruz MD  Department of Hospital Medicine  [facility]

## 2022-09-19 NOTE — PLAN OF CARE
Ongoing (interventions implemented as appropriate)  Pt is alert and oriented.   VSS  Pt able to make needs known.  Pt remained afebrile throughout this shift.   Pt remained free of falls this shift.   Plan of care reviewed. Patient verbalizes understanding.   Pt. Is a turn q 2 hours.  Frequent weight shifting encouraged.  Patient normal sinus rhythm on monitor.   Bed low, side rails up x 2, wheels locked, call light in reach.   Hourly rounding completed.   Will continue to observe.

## 2022-09-19 NOTE — PLAN OF CARE
VSS. Pt remained free of falls this shift. Bed alarm on. Pt complained of generalized pain. Medications administered as ordered. Administered IV pain med and pt getting IV fluids. Pt is normal sinus on monitor. Hourly rounding completed. Pt instructed to call for assistance. POC reviewed with pt.

## 2022-09-19 NOTE — PROGRESS NOTES
[facility]  Pulmonology  Progress Note    Patient Name: Nargis Pastrana  MRN: [id number]  Admission Date: 9/15/2022  Hospital Length of Stay: 3 days  Code Status: DNR  Attending Provider: Jaime Cruz MD  Primary Care Provider: Paul Modi MD   Principal Problem: Right lower lobe pneumonia    Subjective:     9/19 patient seen and examined not in distress.  On oxygen via nasal cannula.  Son at bedside.  Denied a year seen or does use see the other 1  Review of Systems   Constitutional:  Positive for activity change, appetite change and fatigue. Negative for chills, fever and unexpected weight change.   HENT:  Negative for drooling, ear discharge and nosebleeds.    Eyes:  Negative for pain, discharge and itching.   Respiratory:  Positive for cough and shortness of breath. Negative for choking.    Cardiovascular:  Negative for chest pain.   Gastrointestinal:  Negative for anal bleeding.   Endocrine: Negative for cold intolerance.   Genitourinary:  Negative for hematuria.   Musculoskeletal:  Positive for arthralgias, back pain and gait problem. Negative for neck stiffness.   Skin:  Negative for rash.   Allergic/Immunologic: Negative for immunocompromised state.   Neurological:  Positive for weakness. Negative for seizures and facial asymmetry.   Hematological:  Negative for adenopathy.   Psychiatric/Behavioral:  Negative for behavioral problems, self-injury and suicidal ideas.         Nonverbal    Objective:     Vital Signs (Most Recent):  Temp: 98.1 °F (36.7 °C) (09/19/22 0757)  Pulse: 85 (09/19/22 0900)  Resp: 16 (09/19/22 0900)  BP: 138/86 (09/19/22 0757)  SpO2: 96 % (09/19/22 0900)   Vital Signs (24h Range):  Temp:  [97.1 °F (36.2 °C)-99.5 °F (37.5 °C)] 98.1 °F (36.7 °C)  Pulse:  [60-87] 85  Resp:  [16-20] 16  SpO2:  [95 %-98 %] 96 %  BP: (138-192)/() 138/86     Weight: 61 kg (134 lb 7.7 oz)  Body mass index is 21.71 kg/m².      Intake/Output Summary (Last 24 hours) at 9/19/2022 1109  Last data  filed at 9/19/2022 0507  Gross per 24 hour   Intake 602.55 ml   Output 800 ml   Net -197.45 ml         Physical Exam  Vitals and nursing note reviewed.   Constitutional:       General: She is not in acute distress.     Appearance: She is well-developed. She is ill-appearing.   HENT:      Head: Normocephalic and atraumatic.      Nose: Nose normal.   Eyes:      Extraocular Movements: Extraocular movements intact.   Cardiovascular:      Rate and Rhythm: Normal rate and regular rhythm.   Pulmonary:      Effort: Pulmonary effort is normal. No respiratory distress.      Breath sounds: No stridor.   Abdominal:      General: There is no distension.   Genitourinary:     Comments: External urine catheter  Musculoskeletal:         General: No signs of injury.      Cervical back: Normal range of motion and neck supple.   Skin:     General: Skin is warm and dry.   Neurological:      General: No focal deficit present.      Mental Status: She is alert.      Comments: Arousable but Sleepy   Psychiatric:         Behavior: Behavior normal.       Vents:  Oxygen Concentration (%): 28 (09/19/22 0801)    Lines/Drains/Airways       Drain  Duration             Female External Urinary Catheter 09/16/22 0818 3 days                    Significant Labs:    CBC/Anemia Profile:  No results for input(s): WBC, HGB, HCT, PLT, MCV, RDW, IRON, FERRITIN, RETIC, FOLATE, CHEIAIGF71, OCCULTBLOOD in the last 48 hours.       Chemistries:  No results for input(s): NA, K, CL, CO2, BUN, CREATININE, CALCIUM, ALBUMIN, PROT, BILITOT, ALKPHOS, ALT, AST, GLUCOSE, MG, PHOS in the last 48 hours.      MRI brain 09/15/2022 no acute finding          Significant Imaging:     CT chest abdomen pelvis 09/15/2022    EXAMINATION:  CT CHEST ABDOMEN PELVIS WITHOUT CONTRAST(XPD)     CLINICAL HISTORY:  Sepsis;complaining of chest and abdomen pain. Dementia. can't provide accurate history;     TECHNIQUE:  Low dose axial images, sagittal and coronal reformations were obtained from  the thoracic inlet to the pubic synthesis .  Oral contrast was not administered. All CT scans at this facility are performed using dose optimization techniques including the following: automated exposure control; adjustment of the mA and/or kV; use of iterative reconstruction technique.     COMPARISON:  Chest radiograph 09/15/2022     FINDINGS:  Beam hardening artifact, patient motion, noncontrast technique somewhat limits evaluation.     Thoracic soft tissues: No significant abnormality.     Aorta: Mildly tortuous, however normal in caliber.  There is severe atherosclerotic calcification.  Significant calcification additionally present at the origin of the left subclavian artery.     Heart: Normal in size small pericardial effusion.  There is severe coronary artery atherosclerosis.  There is significant calcification of the aortic valve.  Additional calcification present at the mitral valve annulus.     Jasmyn/Mediastinum: No significant lymphadenopathy     Lungs: There is a small right pneumothorax.  There is near complete collapse of the right lower lobe with superimposed consolidation.  Opacity present within the right lower lobe bronchial tree.  There is mild ground-glass density within the right middle lobe, which may be due to atelectatic change versus additional infiltrate.  Within the posterior right upper lobe lies a partially calcified 1.1 x 1.3 cm nodule.  The left lung demonstrates dependent density within the posterior aspect of the left lower lobe which may be due to atelectatic change or additional site of infiltrate.  There is a noncalcified 1.0 cm nodule within the lingula (axial series 6, image 208).  There is trace bilateral pleural fluid, right more than left.  No left pneumothorax.  Bronchial tree on the left appears patent..     Liver: Appears normal in size and attenuation without concerning focal hepatic abnormality within the limits of noncontrast technique and beam hardening artifact.   Calcified granulomas noted.     Gallbladder: No calcified gallstones.     Bile Ducts: No evidence of dilated ducts.     Pancreas: No mass or peripancreatic fat stranding.     Spleen: Unremarkable.     Adrenals: Unremarkable.     Kidneys/ Ureters: Kidneys are small in size with a lobular contour, normal invade location.  No nephrolithiasis or hydronephrosis.  Ureters are normal in course and caliber.  Normal concentration and excretion of contrast. No hydronephrosis or nephrolithiasis. No ureteral dilatation.     Bladder: No evidence of wall thickening.     Reproductive organs: Unremarkable.     GI Tract/Mesentery: Small hiatal hernia.  No evidence of bowel obstruction or inflammation. Significant volume stool within the rectum.  No inflammatory change in the expected region of the appendix to indicate appendicitis.  There are few colonic diverticula without inflammatory change of diverticulitis.  Portions of the sigmoid colon are decompressed with mild mural prominence, suspected related to decompression, though underlying lesion cannot be excluded at these sites, particularly at the rectosigmoid junction (axial series 3, image 140).  Correlate with recent colonoscopy.     Peritoneal Space: No ascites. No free air.     Retroperitoneum: No significant adenopathy.     Abdominal wall: Small fat containing umbilical and right inguinal hernias.     Vasculature: There is severe aortic atherosclerosis.  No aneurysm.     Bones: No acute fracture. There is osteopenia and degenerative change of the spine noting grade 1 retrolisthesis of L2 on L3.  There is mild deformity of the mid sternal body with slightly increased sclerosis at this site.  Correlate for prior injury.     Impression:     Near complete collapse of the right lower lobe with superimposed consolidation, concerning for aspiration or pneumonia.  Opacity present within the right lower lobe bronchial tree, concerning for aspiration, mucous plugging, or  endobronchial lesion.     Small right pneumothorax.     Mild deformity of the mid sternal body with slightly increased sclerosis at this site.  Correlate for prior injury.  No definite acute fracture seen.  Underlying osseous lesion cannot be excluded.     Bilateral pulmonary nodules with the largest measuring 1.3 cm on the right, partially calcified.  A noncalcified 1.0 cm nodule present within the lingula.  For a solid nodule >8 mm, Fleischner Society 2017 guidelines recommend considering CT, PET/CT or tissue sampling at 3 months.          ABG  No results for input(s): PH, PO2, PCO2, HCO3, BE in the last 168 hours.  Assessment/Plan:     * Right lower lobe pneumonia  Likely aspiration related.  Swallow evaluation.  Chest PT. NPO.  Palliative care consult.  IV Rocephin azithromycin.  DC Flagyl.  Rocephin carries good coverage for anaerobes.  9/17 improved from swallow evaluation noted.  On Rocephin azithromycin.  Likely the plan is to switch to comfort care/hospice on DC.  9/18 complete with Rocephin course for pneumonia.  Completed azithromycin.  9/19 completed antibiotic.  DC to nursing home with comfort care    Palliative care encounter  Plan to DC with comfort care.  9/19 comfort care    Lung nodule  Infectious versus metastatic.  Treat pneumonia for now.  Conservative management.  9/17 will not pursue further workup.  Comfort care/hospice on DC  9/19 possibly neoplastic but no diagnostic workup to be pursued    TRAV (acute kidney injury)  LR.  Avoid nephrotoxic drugs  9/16 LR IV fluid.  Creatinine 1.4  9/18 creatinine 1.4.  Discontinue daily labs.  LR.  Nebs q 8 hrs   9/19 assistance with oral intake.    Primary spontaneous pneumothorax  Possibly pneumothorax ex vacuo chest x-ray as needed.  Target O2 sat 92-94%.  9/17 not in distress.  No intervention indicated  9/19 no distress.  Comfort care on DC         Hernando Young MD  Pulmonology  [facility]

## 2022-09-19 NOTE — ASSESSMENT & PLAN NOTE
LR.  Avoid nephrotoxic drugs  9/16 LR IV fluid.  Creatinine 1.4  9/18 creatinine 1.4.  Discontinue daily labs.  LR.  Nebs q 8 hrs   9/19 assistance with oral intake.

## 2022-09-19 NOTE — PLAN OF CARE
[facility]  Discharge Final Note    Primary Care Provider: Paul Modi MD    Expected Discharge Date: 9/19/2022    Final Discharge Note (most recent)       Final Note - 09/19/22 1449          Final Note    Assessment Type Final Discharge Note     Anticipated Discharge Disposition Hospice/Medical Facility        Post-Acute Status    Post-Acute Authorization Placement     Post-Acute Placement Status Set-up Complete/Auth obtained     Hospice Status Set-up Complete/Auth obtained     Discharge Delays None known at this time                   Pt to DC back to [facility] with [facility]. Number to call report [phone]; ask for unit manager.    Panfilo Shepherd LMSW 9/19/2022 2:50 PM

## 2022-09-19 NOTE — ASSESSMENT & PLAN NOTE
Possibly pneumothorax ex vacuo chest x-ray as needed.  Target O2 sat 92-94%.  9/17 not in distress.  No intervention indicated  9/19 no distress.  Comfort care on DC

## 2022-09-19 NOTE — PLAN OF CARE
Attempted to call [facility] to arrange DC. No answer. Left message.    Panfilo Shepherd LMSW 9/19/2022 1:02 PM

## 2022-09-19 NOTE — ASSESSMENT & PLAN NOTE
Infectious versus metastatic.  Treat pneumonia for now.  Conservative management.  9/17 will not pursue further workup.  Comfort care/hospice on DC  9/19 possibly neoplastic but no diagnostic workup to be pursued

## 2022-09-19 NOTE — PLAN OF CARE
Spoke with Suhail MENDOZA who will have admissions manager Zenia call me back on cell.    Panfilo Shepherd LMSW 9/19/2022 2:36 PM

## 2022-09-19 NOTE — ASSESSMENT & PLAN NOTE
Likely aspiration related.  Swallow evaluation.  Chest PT. NPO.  Palliative care consult.  IV Rocephin azithromycin.  DC Flagyl.  Rocephin carries good coverage for anaerobes.  9/17 improved from swallow evaluation noted.  On Rocephin azithromycin.  Likely the plan is to switch to comfort care/hospice on DC.  9/18 complete with Rocephin course for pneumonia.  Completed azithromycin.  9/19 completed antibiotic.  DC to nursing home with comfort care

## 2022-09-19 NOTE — SUBJECTIVE & OBJECTIVE
9/19 patient seen and examined not in distress.  On oxygen via nasal cannula.  Son at bedside.  Denied a year seen or does use see the other 1  Review of Systems   Constitutional:  Positive for activity change, appetite change and fatigue. Negative for chills, fever and unexpected weight change.   HENT:  Negative for drooling, ear discharge and nosebleeds.    Eyes:  Negative for pain, discharge and itching.   Respiratory:  Positive for cough and shortness of breath. Negative for choking.    Cardiovascular:  Negative for chest pain.   Gastrointestinal:  Negative for anal bleeding.   Endocrine: Negative for cold intolerance.   Genitourinary:  Negative for hematuria.   Musculoskeletal:  Positive for arthralgias, back pain and gait problem. Negative for neck stiffness.   Skin:  Negative for rash.   Allergic/Immunologic: Negative for immunocompromised state.   Neurological:  Positive for weakness. Negative for seizures and facial asymmetry.   Hematological:  Negative for adenopathy.   Psychiatric/Behavioral:  Negative for behavioral problems, self-injury and suicidal ideas.         Nonverbal    Objective:     Vital Signs (Most Recent):  Temp: 98.1 °F (36.7 °C) (09/19/22 0757)  Pulse: 85 (09/19/22 0900)  Resp: 16 (09/19/22 0900)  BP: 138/86 (09/19/22 0757)  SpO2: 96 % (09/19/22 0900)   Vital Signs (24h Range):  Temp:  [97.1 °F (36.2 °C)-99.5 °F (37.5 °C)] 98.1 °F (36.7 °C)  Pulse:  [60-87] 85  Resp:  [16-20] 16  SpO2:  [95 %-98 %] 96 %  BP: (138-192)/() 138/86     Weight: 61 kg (134 lb 7.7 oz)  Body mass index is 21.71 kg/m².      Intake/Output Summary (Last 24 hours) at 9/19/2022 1109  Last data filed at 9/19/2022 0507  Gross per 24 hour   Intake 602.55 ml   Output 800 ml   Net -197.45 ml         Physical Exam  Vitals and nursing note reviewed.   Constitutional:       General: She is not in acute distress.     Appearance: She is well-developed. She is ill-appearing.   HENT:      Head: Normocephalic and atraumatic.       Nose: Nose normal.   Eyes:      Extraocular Movements: Extraocular movements intact.   Cardiovascular:      Rate and Rhythm: Normal rate and regular rhythm.   Pulmonary:      Effort: Pulmonary effort is normal. No respiratory distress.      Breath sounds: No stridor.   Abdominal:      General: There is no distension.   Genitourinary:     Comments: External urine catheter  Musculoskeletal:         General: No signs of injury.      Cervical back: Normal range of motion and neck supple.   Skin:     General: Skin is warm and dry.   Neurological:      General: No focal deficit present.      Mental Status: She is alert.      Comments: Arousable but Sleepy   Psychiatric:         Behavior: Behavior normal.       Vents:  Oxygen Concentration (%): 28 (09/19/22 0801)    Lines/Drains/Airways       Drain  Duration             Female External Urinary Catheter 09/16/22 0818 3 days                    Significant Labs:    CBC/Anemia Profile:  No results for input(s): WBC, HGB, HCT, PLT, MCV, RDW, IRON, FERRITIN, RETIC, FOLATE, [id number], OCCULTBLOOD in the last 48 hours.       Chemistries:  No results for input(s): NA, K, CL, CO2, BUN, CREATININE, CALCIUM, ALBUMIN, PROT, BILITOT, ALKPHOS, ALT, AST, GLUCOSE, MG, PHOS in the last 48 hours.      MRI brain 09/15/2022 no acute finding          Significant Imaging:     CT chest abdomen pelvis 09/15/2022    EXAMINATION:  CT CHEST ABDOMEN PELVIS WITHOUT CONTRAST(XPD)     CLINICAL HISTORY:  Sepsis;complaining of chest and abdomen pain. Dementia. can't provide accurate history;     TECHNIQUE:  Low dose axial images, sagittal and coronal reformations were obtained from the thoracic inlet to the pubic synthesis .  Oral contrast was not administered. All CT scans at this facility are performed using dose optimization techniques including the following: automated exposure control; adjustment of the mA and/or kV; use of iterative reconstruction technique.     COMPARISON:  Chest radiograph  09/15/2022     FINDINGS:  Beam hardening artifact, patient motion, noncontrast technique somewhat limits evaluation.     Thoracic soft tissues: No significant abnormality.     Aorta: Mildly tortuous, however normal in caliber.  There is severe atherosclerotic calcification.  Significant calcification additionally present at the origin of the left subclavian artery.     Heart: Normal in size small pericardial effusion.  There is severe coronary artery atherosclerosis.  There is significant calcification of the aortic valve.  Additional calcification present at the mitral valve annulus.     Jasmyn/Mediastinum: No significant lymphadenopathy     Lungs: There is a small right pneumothorax.  There is near complete collapse of the right lower lobe with superimposed consolidation.  Opacity present within the right lower lobe bronchial tree.  There is mild ground-glass density within the right middle lobe, which may be due to atelectatic change versus additional infiltrate.  Within the posterior right upper lobe lies a partially calcified 1.1 x 1.3 cm nodule.  The left lung demonstrates dependent density within the posterior aspect of the left lower lobe which may be due to atelectatic change or additional site of infiltrate.  There is a noncalcified 1.0 cm nodule within the lingula (axial series 6, image 208).  There is trace bilateral pleural fluid, right more than left.  No left pneumothorax.  Bronchial tree on the left appears patent..     Liver: Appears normal in size and attenuation without concerning focal hepatic abnormality within the limits of noncontrast technique and beam hardening artifact.  Calcified granulomas noted.     Gallbladder: No calcified gallstones.     Bile Ducts: No evidence of dilated ducts.     Pancreas: No mass or peripancreatic fat stranding.     Spleen: Unremarkable.     Adrenals: Unremarkable.     Kidneys/ Ureters: Kidneys are small in size with a lobular contour, normal invade location.  No  nephrolithiasis or hydronephrosis.  Ureters are normal in course and caliber.  Normal concentration and excretion of contrast. No hydronephrosis or nephrolithiasis. No ureteral dilatation.     Bladder: No evidence of wall thickening.     Reproductive organs: Unremarkable.     GI Tract/Mesentery: Small hiatal hernia.  No evidence of bowel obstruction or inflammation. Significant volume stool within the rectum.  No inflammatory change in the expected region of the appendix to indicate appendicitis.  There are few colonic diverticula without inflammatory change of diverticulitis.  Portions of the sigmoid colon are decompressed with mild mural prominence, suspected related to decompression, though underlying lesion cannot be excluded at these sites, particularly at the rectosigmoid junction (axial series 3, image 140).  Correlate with recent colonoscopy.     Peritoneal Space: No ascites. No free air.     Retroperitoneum: No significant adenopathy.     Abdominal wall: Small fat containing umbilical and right inguinal hernias.     Vasculature: There is severe aortic atherosclerosis.  No aneurysm.     Bones: No acute fracture. There is osteopenia and degenerative change of the spine noting grade 1 retrolisthesis of L2 on L3.  There is mild deformity of the mid sternal body with slightly increased sclerosis at this site.  Correlate for prior injury.     Impression:     Near complete collapse of the right lower lobe with superimposed consolidation, concerning for aspiration or pneumonia.  Opacity present within the right lower lobe bronchial tree, concerning for aspiration, mucous plugging, or endobronchial lesion.     Small right pneumothorax.     Mild deformity of the mid sternal body with slightly increased sclerosis at this site.  Correlate for prior injury.  No definite acute fracture seen.  Underlying osseous lesion cannot be excluded.     Bilateral pulmonary nodules with the largest measuring 1.3 cm on the right,  partially calcified.  A noncalcified 1.0 cm nodule present within the lingula.  For a solid nodule >8 mm, Fleischner Society 2017 guidelines recommend considering CT, PET/CT or tissue sampling at 3 months.

## 2022-09-20 LAB
BACTERIA BLD CULT: NORMAL
BACTERIA BLD CULT: NORMAL

## 2022-12-19 PROBLEM — N17.9 AKI (ACUTE KIDNEY INJURY): Status: RESOLVED | Noted: 2022-09-15 | Resolved: 2022-12-19

## 2022-12-19 PROBLEM — J18.9 RIGHT LOWER LOBE PNEUMONIA: Status: RESOLVED | Noted: 2022-09-15 | Resolved: 2022-12-19
